# Patient Record
Sex: FEMALE | Race: WHITE | NOT HISPANIC OR LATINO | URBAN - METROPOLITAN AREA
[De-identification: names, ages, dates, MRNs, and addresses within clinical notes are randomized per-mention and may not be internally consistent; named-entity substitution may affect disease eponyms.]

---

## 2020-01-01 ENCOUNTER — INPATIENT (INPATIENT)
Facility: HOSPITAL | Age: 85
LOS: 1 days | DRG: 951 | End: 2020-06-14
Attending: FAMILY MEDICINE | Admitting: FAMILY MEDICINE
Payer: OTHER MISCELLANEOUS

## 2020-01-01 ENCOUNTER — INPATIENT (INPATIENT)
Facility: HOSPITAL | Age: 85
LOS: 8 days | Discharge: HOME | DRG: 871 | End: 2020-06-12
Attending: STUDENT IN AN ORGANIZED HEALTH CARE EDUCATION/TRAINING PROGRAM | Admitting: STUDENT IN AN ORGANIZED HEALTH CARE EDUCATION/TRAINING PROGRAM
Payer: MEDICARE

## 2020-01-01 VITALS
OXYGEN SATURATION: 98 % | SYSTOLIC BLOOD PRESSURE: 190 MMHG | HEIGHT: 62 IN | HEART RATE: 82 BPM | WEIGHT: 89.95 LBS | DIASTOLIC BLOOD PRESSURE: 72 MMHG | RESPIRATION RATE: 22 BRPM

## 2020-01-01 VITALS
OXYGEN SATURATION: 95 % | SYSTOLIC BLOOD PRESSURE: 109 MMHG | DIASTOLIC BLOOD PRESSURE: 55 MMHG | TEMPERATURE: 97 F | RESPIRATION RATE: 18 BRPM | HEART RATE: 109 BPM

## 2020-01-01 VITALS
OXYGEN SATURATION: 88 % | RESPIRATION RATE: 16 BRPM | DIASTOLIC BLOOD PRESSURE: 58 MMHG | HEART RATE: 113 BPM | SYSTOLIC BLOOD PRESSURE: 106 MMHG | TEMPERATURE: 97 F

## 2020-01-01 VITALS
SYSTOLIC BLOOD PRESSURE: 162 MMHG | OXYGEN SATURATION: 100 % | RESPIRATION RATE: 22 BRPM | DIASTOLIC BLOOD PRESSURE: 88 MMHG | HEART RATE: 127 BPM | TEMPERATURE: 99 F

## 2020-01-01 DIAGNOSIS — I16.0 HYPERTENSIVE URGENCY: ICD-10-CM

## 2020-01-01 DIAGNOSIS — Z51.5 ENCOUNTER FOR PALLIATIVE CARE: ICD-10-CM

## 2020-01-01 DIAGNOSIS — K11.7 DISTURBANCES OF SALIVARY SECRETION: ICD-10-CM

## 2020-01-01 DIAGNOSIS — F03.90 UNSPECIFIED DEMENTIA WITHOUT BEHAVIORAL DISTURBANCE: ICD-10-CM

## 2020-01-01 DIAGNOSIS — R06.03 ACUTE RESPIRATORY DISTRESS: ICD-10-CM

## 2020-01-01 DIAGNOSIS — I48.91 UNSPECIFIED ATRIAL FIBRILLATION: ICD-10-CM

## 2020-01-01 DIAGNOSIS — J18.9 PNEUMONIA, UNSPECIFIED ORGANISM: ICD-10-CM

## 2020-01-01 DIAGNOSIS — R53.81 OTHER MALAISE: ICD-10-CM
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
## 2020-01-01 DIAGNOSIS — J96.01 ACUTE RESPIRATORY FAILURE WITH HYPOXIA: ICD-10-CM

## 2020-01-01 DIAGNOSIS — G30.1 ALZHEIMER'S DISEASE WITH LATE ONSET: ICD-10-CM

## 2020-01-01 DIAGNOSIS — E43 UNSPECIFIED SEVERE PROTEIN-CALORIE MALNUTRITION: ICD-10-CM

## 2020-01-01 DIAGNOSIS — N17.9 ACUTE KIDNEY FAILURE, UNSPECIFIED: ICD-10-CM

## 2020-01-01 DIAGNOSIS — Z29.9 ENCOUNTER FOR PROPHYLACTIC MEASURES, UNSPECIFIED: ICD-10-CM

## 2020-01-01 DIAGNOSIS — R45.1 RESTLESSNESS AND AGITATION: ICD-10-CM

## 2020-01-01 DIAGNOSIS — Z71.89 OTHER SPECIFIED COUNSELING: ICD-10-CM

## 2020-01-01 LAB
A1C WITH ESTIMATED AVERAGE GLUCOSE RESULT: 5.2 % — SIGNIFICANT CHANGE UP (ref 4–5.6)
ALBUMIN SERPL ELPH-MCNC: 2.9 G/DL — LOW (ref 3.5–5)
ALBUMIN SERPL ELPH-MCNC: 3.1 G/DL — LOW (ref 3.5–5)
ALBUMIN SERPL ELPH-MCNC: 3.1 G/DL — LOW (ref 3.5–5)
ALBUMIN SERPL ELPH-MCNC: 3.4 G/DL — LOW (ref 3.5–5)
ALBUMIN SERPL ELPH-MCNC: 3.5 G/DL — SIGNIFICANT CHANGE UP (ref 3.5–5)
ALBUMIN SERPL ELPH-MCNC: 3.9 G/DL — SIGNIFICANT CHANGE UP (ref 3.5–5)
ALBUMIN SERPL ELPH-MCNC: 4.2 G/DL — SIGNIFICANT CHANGE UP (ref 3.5–5)
ALP SERPL-CCNC: 100 U/L — SIGNIFICANT CHANGE UP (ref 40–120)
ALP SERPL-CCNC: 106 U/L — SIGNIFICANT CHANGE UP (ref 40–120)
ALP SERPL-CCNC: 106 U/L — SIGNIFICANT CHANGE UP (ref 40–120)
ALP SERPL-CCNC: 127 U/L — HIGH (ref 40–120)
ALP SERPL-CCNC: 129 U/L — HIGH (ref 40–120)
ALP SERPL-CCNC: 90 U/L — SIGNIFICANT CHANGE UP (ref 40–120)
ALP SERPL-CCNC: 91 U/L — SIGNIFICANT CHANGE UP (ref 40–120)
ALT FLD-CCNC: 27 U/L DA — SIGNIFICANT CHANGE UP (ref 10–60)
ALT FLD-CCNC: 350 U/L DA — HIGH (ref 10–60)
ALT FLD-CCNC: 36 U/L DA — SIGNIFICANT CHANGE UP (ref 10–60)
ALT FLD-CCNC: 388 U/L DA — HIGH (ref 10–60)
ALT FLD-CCNC: 42 U/L DA — SIGNIFICANT CHANGE UP (ref 10–60)
ALT FLD-CCNC: 45 U/L DA — SIGNIFICANT CHANGE UP (ref 10–60)
ALT FLD-CCNC: 543 U/L DA — HIGH (ref 10–60)
ANION GAP SERPL CALC-SCNC: 10 MMOL/L — SIGNIFICANT CHANGE UP (ref 5–17)
ANION GAP SERPL CALC-SCNC: 10 MMOL/L — SIGNIFICANT CHANGE UP (ref 5–17)
ANION GAP SERPL CALC-SCNC: 11 MMOL/L — SIGNIFICANT CHANGE UP (ref 5–17)
ANION GAP SERPL CALC-SCNC: 11 MMOL/L — SIGNIFICANT CHANGE UP (ref 5–17)
ANION GAP SERPL CALC-SCNC: 12 MMOL/L — SIGNIFICANT CHANGE UP (ref 5–17)
ANION GAP SERPL CALC-SCNC: 13 MMOL/L — SIGNIFICANT CHANGE UP (ref 5–17)
ANION GAP SERPL CALC-SCNC: 19 MMOL/L — HIGH (ref 5–17)
ANION GAP SERPL CALC-SCNC: 7 MMOL/L — SIGNIFICANT CHANGE UP (ref 5–17)
ANION GAP SERPL CALC-SCNC: 9 MMOL/L — SIGNIFICANT CHANGE UP (ref 5–17)
ANISOCYTOSIS BLD QL: SLIGHT — SIGNIFICANT CHANGE UP
APPEARANCE UR: CLEAR — SIGNIFICANT CHANGE UP
APPEARANCE UR: CLEAR — SIGNIFICANT CHANGE UP
APTT BLD: 29.5 SEC — SIGNIFICANT CHANGE UP (ref 27.5–36.3)
AST SERPL-CCNC: 190 U/L — HIGH (ref 10–40)
AST SERPL-CCNC: 262 U/L — HIGH (ref 10–40)
AST SERPL-CCNC: 44 U/L — HIGH (ref 10–40)
AST SERPL-CCNC: 52 U/L — HIGH (ref 10–40)
AST SERPL-CCNC: 679 U/L — HIGH (ref 10–40)
AST SERPL-CCNC: 86 U/L — HIGH (ref 10–40)
AST SERPL-CCNC: 93 U/L — HIGH (ref 10–40)
BACTERIA # UR AUTO: ABNORMAL /HPF
BACTERIA # UR AUTO: NEGATIVE /HPF — SIGNIFICANT CHANGE UP
BASE EXCESS BLDA CALC-SCNC: -3.3 MMOL/L — LOW (ref -2–2)
BASOPHILS # BLD AUTO: 0 K/UL — SIGNIFICANT CHANGE UP (ref 0–0.2)
BASOPHILS # BLD AUTO: 0.01 K/UL — SIGNIFICANT CHANGE UP (ref 0–0.2)
BASOPHILS # BLD AUTO: 0.02 K/UL — SIGNIFICANT CHANGE UP (ref 0–0.2)
BASOPHILS # BLD AUTO: 0.06 K/UL — SIGNIFICANT CHANGE UP (ref 0–0.2)
BASOPHILS NFR BLD AUTO: 0 % — SIGNIFICANT CHANGE UP (ref 0–2)
BASOPHILS NFR BLD AUTO: 0.1 % — SIGNIFICANT CHANGE UP (ref 0–2)
BASOPHILS NFR BLD AUTO: 0.3 % — SIGNIFICANT CHANGE UP (ref 0–2)
BILIRUB SERPL-MCNC: 1.1 MG/DL — SIGNIFICANT CHANGE UP (ref 0.2–1.2)
BILIRUB SERPL-MCNC: 1.2 MG/DL — SIGNIFICANT CHANGE UP (ref 0.2–1.2)
BILIRUB SERPL-MCNC: 1.3 MG/DL — HIGH (ref 0.2–1.2)
BILIRUB UR-MCNC: NEGATIVE — SIGNIFICANT CHANGE UP
BILIRUB UR-MCNC: NEGATIVE — SIGNIFICANT CHANGE UP
BLOOD GAS COMMENTS ARTERIAL: SIGNIFICANT CHANGE UP
BLOOD GAS COMMENTS ARTERIAL: SIGNIFICANT CHANGE UP
BUN SERPL-MCNC: 46 MG/DL — HIGH (ref 7–18)
BUN SERPL-MCNC: 47 MG/DL — HIGH (ref 7–18)
BUN SERPL-MCNC: 57 MG/DL — HIGH (ref 7–18)
BUN SERPL-MCNC: 65 MG/DL — HIGH (ref 7–18)
BUN SERPL-MCNC: 82 MG/DL — HIGH (ref 7–18)
BUN SERPL-MCNC: 92 MG/DL — HIGH (ref 7–18)
BUN SERPL-MCNC: 93 MG/DL — HIGH (ref 7–18)
BUN SERPL-MCNC: 95 MG/DL — HIGH (ref 7–18)
BUN SERPL-MCNC: 95 MG/DL — HIGH (ref 7–18)
CALCIUM SERPL-MCNC: 8.5 MG/DL — SIGNIFICANT CHANGE UP (ref 8.4–10.5)
CALCIUM SERPL-MCNC: 8.6 MG/DL — SIGNIFICANT CHANGE UP (ref 8.4–10.5)
CALCIUM SERPL-MCNC: 8.7 MG/DL — SIGNIFICANT CHANGE UP (ref 8.4–10.5)
CALCIUM SERPL-MCNC: 8.8 MG/DL — SIGNIFICANT CHANGE UP (ref 8.4–10.5)
CALCIUM SERPL-MCNC: 9.1 MG/DL — SIGNIFICANT CHANGE UP (ref 8.4–10.5)
CALCIUM SERPL-MCNC: 9.4 MG/DL — SIGNIFICANT CHANGE UP (ref 8.4–10.5)
CALCIUM SERPL-MCNC: 9.8 MG/DL — SIGNIFICANT CHANGE UP (ref 8.4–10.5)
CALCIUM UR-MCNC: <1 MG/DL — SIGNIFICANT CHANGE UP
CHLORIDE SERPL-SCNC: 103 MMOL/L — SIGNIFICANT CHANGE UP (ref 96–108)
CHLORIDE SERPL-SCNC: 107 MMOL/L — SIGNIFICANT CHANGE UP (ref 96–108)
CHLORIDE SERPL-SCNC: 109 MMOL/L — HIGH (ref 96–108)
CHLORIDE SERPL-SCNC: 111 MMOL/L — HIGH (ref 96–108)
CHLORIDE SERPL-SCNC: 113 MMOL/L — HIGH (ref 96–108)
CHLORIDE SERPL-SCNC: 117 MMOL/L — HIGH (ref 96–108)
CHLORIDE SERPL-SCNC: 118 MMOL/L — HIGH (ref 96–108)
CHLORIDE UR-SCNC: <10 MMOL/L — SIGNIFICANT CHANGE UP
CHOLEST SERPL-MCNC: 201 MG/DL — HIGH (ref 10–199)
CK MB BLD-MCNC: 1.7 % — SIGNIFICANT CHANGE UP (ref 0–3.5)
CK MB BLD-MCNC: 1.8 % — SIGNIFICANT CHANGE UP (ref 0–3.5)
CK MB CFR SERPL CALC: 17.9 NG/ML — HIGH (ref 0–3.6)
CK MB CFR SERPL CALC: 21.4 NG/ML — HIGH (ref 0–3.6)
CK SERPL-CCNC: 1050 U/L — HIGH (ref 21–215)
CK SERPL-CCNC: 1180 U/L — HIGH (ref 21–215)
CK SERPL-CCNC: 835 U/L — HIGH (ref 21–215)
CO2 SERPL-SCNC: 20 MMOL/L — LOW (ref 22–31)
CO2 SERPL-SCNC: 21 MMOL/L — LOW (ref 22–31)
CO2 SERPL-SCNC: 23 MMOL/L — SIGNIFICANT CHANGE UP (ref 22–31)
CO2 SERPL-SCNC: 25 MMOL/L — SIGNIFICANT CHANGE UP (ref 22–31)
CO2 SERPL-SCNC: 26 MMOL/L — SIGNIFICANT CHANGE UP (ref 22–31)
CO2 SERPL-SCNC: 27 MMOL/L — SIGNIFICANT CHANGE UP (ref 22–31)
CO2 SERPL-SCNC: 28 MMOL/L — SIGNIFICANT CHANGE UP (ref 22–31)
CO2 SERPL-SCNC: 28 MMOL/L — SIGNIFICANT CHANGE UP (ref 22–31)
CO2 SERPL-SCNC: 29 MMOL/L — SIGNIFICANT CHANGE UP (ref 22–31)
COLOR SPEC: SIGNIFICANT CHANGE UP
COLOR SPEC: YELLOW — SIGNIFICANT CHANGE UP
COMMENT - URINE: SIGNIFICANT CHANGE UP
CREAT ?TM UR-MCNC: 93 MG/DL — SIGNIFICANT CHANGE UP
CREAT ?TM UR-MCNC: <13 MG/DL — SIGNIFICANT CHANGE UP
CREAT SERPL-MCNC: 1.49 MG/DL — HIGH (ref 0.5–1.3)
CREAT SERPL-MCNC: 1.68 MG/DL — HIGH (ref 0.5–1.3)
CREAT SERPL-MCNC: 1.71 MG/DL — HIGH (ref 0.5–1.3)
CREAT SERPL-MCNC: 1.83 MG/DL — HIGH (ref 0.5–1.3)
CREAT SERPL-MCNC: 2.09 MG/DL — HIGH (ref 0.5–1.3)
CREAT SERPL-MCNC: 2.18 MG/DL — HIGH (ref 0.5–1.3)
CREAT SERPL-MCNC: 2.25 MG/DL — HIGH (ref 0.5–1.3)
CREAT SERPL-MCNC: 2.27 MG/DL — HIGH (ref 0.5–1.3)
CREAT SERPL-MCNC: 2.3 MG/DL — HIGH (ref 0.5–1.3)
CRP SERPL-MCNC: 3.09 MG/DL — HIGH (ref 0–0.4)
CULTURE RESULTS: NO GROWTH — SIGNIFICANT CHANGE UP
CULTURE RESULTS: SIGNIFICANT CHANGE UP
CULTURE RESULTS: SIGNIFICANT CHANGE UP
D DIMER BLD IA.RAPID-MCNC: 971 NG/ML DDU — HIGH
DIFF PNL FLD: ABNORMAL
DIFF PNL FLD: ABNORMAL
EOSINOPHIL # BLD AUTO: 0 K/UL — SIGNIFICANT CHANGE UP (ref 0–0.5)
EOSINOPHIL # BLD AUTO: 0.01 K/UL — SIGNIFICANT CHANGE UP (ref 0–0.5)
EOSINOPHIL # BLD AUTO: 0.02 K/UL — SIGNIFICANT CHANGE UP (ref 0–0.5)
EOSINOPHIL NFR BLD AUTO: 0 % — SIGNIFICANT CHANGE UP (ref 0–6)
EOSINOPHIL NFR BLD AUTO: 0.1 % — SIGNIFICANT CHANGE UP (ref 0–6)
EOSINOPHIL NFR BLD AUTO: 0.1 % — SIGNIFICANT CHANGE UP (ref 0–6)
EPI CELLS # UR: SIGNIFICANT CHANGE UP /HPF
EPI CELLS # UR: SIGNIFICANT CHANGE UP /HPF
ERYTHROCYTE [SEDIMENTATION RATE] IN BLOOD: 21 MM/HR — HIGH (ref 0–20)
ESTIMATED AVERAGE GLUCOSE: 103 MG/DL — SIGNIFICANT CHANGE UP (ref 68–114)
FOLATE SERPL-MCNC: 17 NG/ML — SIGNIFICANT CHANGE UP
GIANT PLATELETS BLD QL SMEAR: PRESENT — SIGNIFICANT CHANGE UP
GLUCOSE BLDC GLUCOMTR-MCNC: 102 MG/DL — HIGH (ref 70–99)
GLUCOSE BLDC GLUCOMTR-MCNC: 104 MG/DL — HIGH (ref 70–99)
GLUCOSE BLDC GLUCOMTR-MCNC: 104 MG/DL — HIGH (ref 70–99)
GLUCOSE BLDC GLUCOMTR-MCNC: 108 MG/DL — HIGH (ref 70–99)
GLUCOSE BLDC GLUCOMTR-MCNC: 109 MG/DL — HIGH (ref 70–99)
GLUCOSE BLDC GLUCOMTR-MCNC: 111 MG/DL — HIGH (ref 70–99)
GLUCOSE BLDC GLUCOMTR-MCNC: 113 MG/DL — HIGH (ref 70–99)
GLUCOSE BLDC GLUCOMTR-MCNC: 116 MG/DL — HIGH (ref 70–99)
GLUCOSE BLDC GLUCOMTR-MCNC: 117 MG/DL — HIGH (ref 70–99)
GLUCOSE BLDC GLUCOMTR-MCNC: 121 MG/DL — HIGH (ref 70–99)
GLUCOSE BLDC GLUCOMTR-MCNC: 127 MG/DL — HIGH (ref 70–99)
GLUCOSE BLDC GLUCOMTR-MCNC: 128 MG/DL — HIGH (ref 70–99)
GLUCOSE BLDC GLUCOMTR-MCNC: 133 MG/DL — HIGH (ref 70–99)
GLUCOSE BLDC GLUCOMTR-MCNC: 135 MG/DL — HIGH (ref 70–99)
GLUCOSE BLDC GLUCOMTR-MCNC: 136 MG/DL — HIGH (ref 70–99)
GLUCOSE BLDC GLUCOMTR-MCNC: 136 MG/DL — HIGH (ref 70–99)
GLUCOSE BLDC GLUCOMTR-MCNC: 139 MG/DL — HIGH (ref 70–99)
GLUCOSE BLDC GLUCOMTR-MCNC: 143 MG/DL — HIGH (ref 70–99)
GLUCOSE BLDC GLUCOMTR-MCNC: 146 MG/DL — HIGH (ref 70–99)
GLUCOSE BLDC GLUCOMTR-MCNC: 148 MG/DL — HIGH (ref 70–99)
GLUCOSE BLDC GLUCOMTR-MCNC: 158 MG/DL — HIGH (ref 70–99)
GLUCOSE BLDC GLUCOMTR-MCNC: 160 MG/DL — HIGH (ref 70–99)
GLUCOSE BLDC GLUCOMTR-MCNC: 166 MG/DL — HIGH (ref 70–99)
GLUCOSE BLDC GLUCOMTR-MCNC: 93 MG/DL — SIGNIFICANT CHANGE UP (ref 70–99)
GLUCOSE BLDC GLUCOMTR-MCNC: 96 MG/DL — SIGNIFICANT CHANGE UP (ref 70–99)
GLUCOSE BLDC GLUCOMTR-MCNC: 98 MG/DL — SIGNIFICANT CHANGE UP (ref 70–99)
GLUCOSE SERPL-MCNC: 115 MG/DL — HIGH (ref 70–99)
GLUCOSE SERPL-MCNC: 127 MG/DL — HIGH (ref 70–99)
GLUCOSE SERPL-MCNC: 128 MG/DL — HIGH (ref 70–99)
GLUCOSE SERPL-MCNC: 140 MG/DL — HIGH (ref 70–99)
GLUCOSE SERPL-MCNC: 141 MG/DL — HIGH (ref 70–99)
GLUCOSE SERPL-MCNC: 151 MG/DL — HIGH (ref 70–99)
GLUCOSE SERPL-MCNC: 160 MG/DL — HIGH (ref 70–99)
GLUCOSE SERPL-MCNC: 77 MG/DL — SIGNIFICANT CHANGE UP (ref 70–99)
GLUCOSE SERPL-MCNC: 89 MG/DL — SIGNIFICANT CHANGE UP (ref 70–99)
GLUCOSE UR QL: NEGATIVE — SIGNIFICANT CHANGE UP
GLUCOSE UR QL: NEGATIVE — SIGNIFICANT CHANGE UP
HCO3 BLDA-SCNC: 21 MMOL/L — LOW (ref 23–27)
HCT VFR BLD CALC: 34.2 % — LOW (ref 34.5–45)
HCT VFR BLD CALC: 36.4 % — SIGNIFICANT CHANGE UP (ref 34.5–45)
HCT VFR BLD CALC: 36.6 % — SIGNIFICANT CHANGE UP (ref 34.5–45)
HCT VFR BLD CALC: 37.1 % — SIGNIFICANT CHANGE UP (ref 34.5–45)
HCT VFR BLD CALC: 37.2 % — SIGNIFICANT CHANGE UP (ref 34.5–45)
HCT VFR BLD CALC: 37.8 % — SIGNIFICANT CHANGE UP (ref 34.5–45)
HCT VFR BLD CALC: 38 % — SIGNIFICANT CHANGE UP (ref 34.5–45)
HDLC SERPL-MCNC: 93 MG/DL — SIGNIFICANT CHANGE UP
HGB BLD-MCNC: 10.9 G/DL — LOW (ref 11.5–15.5)
HGB BLD-MCNC: 11.3 G/DL — LOW (ref 11.5–15.5)
HGB BLD-MCNC: 11.3 G/DL — LOW (ref 11.5–15.5)
HGB BLD-MCNC: 11.5 G/DL — SIGNIFICANT CHANGE UP (ref 11.5–15.5)
HGB BLD-MCNC: 11.7 G/DL — SIGNIFICANT CHANGE UP (ref 11.5–15.5)
HOROWITZ INDEX BLDA+IHG-RTO: 100 — SIGNIFICANT CHANGE UP
IMM GRANULOCYTES NFR BLD AUTO: 0.5 % — SIGNIFICANT CHANGE UP (ref 0–1.5)
IMM GRANULOCYTES NFR BLD AUTO: 0.6 % — SIGNIFICANT CHANGE UP (ref 0–1.5)
IMM GRANULOCYTES NFR BLD AUTO: 1.2 % — SIGNIFICANT CHANGE UP (ref 0–1.5)
IMM GRANULOCYTES NFR BLD AUTO: 1.7 % — HIGH (ref 0–1.5)
INR BLD: 0.97 RATIO — SIGNIFICANT CHANGE UP (ref 0.88–1.16)
KETONES UR-MCNC: NEGATIVE — SIGNIFICANT CHANGE UP
KETONES UR-MCNC: NEGATIVE — SIGNIFICANT CHANGE UP
LACTATE SERPL-SCNC: 1.9 MMOL/L — SIGNIFICANT CHANGE UP (ref 0.7–2)
LACTATE SERPL-SCNC: 2.3 MMOL/L — HIGH (ref 0.7–2)
LACTATE SERPL-SCNC: 2.6 MMOL/L — HIGH (ref 0.7–2)
LACTATE SERPL-SCNC: 3.2 MMOL/L — HIGH (ref 0.7–2)
LACTATE SERPL-SCNC: 3.8 MMOL/L — HIGH (ref 0.7–2)
LDH SERPL L TO P-CCNC: 351 U/L — HIGH (ref 120–225)
LEUKOCYTE ESTERASE UR-ACNC: NEGATIVE — SIGNIFICANT CHANGE UP
LEUKOCYTE ESTERASE UR-ACNC: NEGATIVE — SIGNIFICANT CHANGE UP
LG PLATELETS BLD QL AUTO: SLIGHT — SIGNIFICANT CHANGE UP
LIPID PNL WITH DIRECT LDL SERPL: 97 MG/DL — SIGNIFICANT CHANGE UP
LYMPHOCYTES # BLD AUTO: 0.59 K/UL — LOW (ref 1–3.3)
LYMPHOCYTES # BLD AUTO: 0.65 K/UL — LOW (ref 1–3.3)
LYMPHOCYTES # BLD AUTO: 0.79 K/UL — LOW (ref 1–3.3)
LYMPHOCYTES # BLD AUTO: 0.91 K/UL — LOW (ref 1–3.3)
LYMPHOCYTES # BLD AUTO: 0.98 K/UL — LOW (ref 1–3.3)
LYMPHOCYTES # BLD AUTO: 1.01 K/UL — SIGNIFICANT CHANGE UP (ref 1–3.3)
LYMPHOCYTES # BLD AUTO: 1.17 K/UL — SIGNIFICANT CHANGE UP (ref 1–3.3)
LYMPHOCYTES # BLD AUTO: 3.1 % — LOW (ref 13–44)
LYMPHOCYTES # BLD AUTO: 3.4 % — LOW (ref 13–44)
LYMPHOCYTES # BLD AUTO: 3.6 % — LOW (ref 13–44)
LYMPHOCYTES # BLD AUTO: 4 % — LOW (ref 13–44)
LYMPHOCYTES # BLD AUTO: 6.3 % — LOW (ref 13–44)
LYMPHOCYTES # BLD AUTO: 7.8 % — LOW (ref 13–44)
LYMPHOCYTES # BLD AUTO: 8.8 % — LOW (ref 13–44)
MACROCYTES BLD QL: SLIGHT — SIGNIFICANT CHANGE UP
MAGNESIUM SERPL-MCNC: 2.1 MG/DL — SIGNIFICANT CHANGE UP (ref 1.6–2.6)
MAGNESIUM SERPL-MCNC: 2.6 MG/DL — SIGNIFICANT CHANGE UP (ref 1.6–2.6)
MAGNESIUM SERPL-MCNC: 2.8 MG/DL — HIGH (ref 1.6–2.6)
MAGNESIUM SERPL-MCNC: 2.9 MG/DL — HIGH (ref 1.6–2.6)
MAGNESIUM SERPL-MCNC: 3 MG/DL — HIGH (ref 1.6–2.6)
MAGNESIUM SERPL-MCNC: 3.1 MG/DL — HIGH (ref 1.6–2.6)
MANUAL SMEAR VERIFICATION: SIGNIFICANT CHANGE UP
MANUAL SMEAR VERIFICATION: SIGNIFICANT CHANGE UP
MCHC RBC-ENTMCNC: 28 PG — SIGNIFICANT CHANGE UP (ref 27–34)
MCHC RBC-ENTMCNC: 28 PG — SIGNIFICANT CHANGE UP (ref 27–34)
MCHC RBC-ENTMCNC: 28.3 PG — SIGNIFICANT CHANGE UP (ref 27–34)
MCHC RBC-ENTMCNC: 28.3 PG — SIGNIFICANT CHANGE UP (ref 27–34)
MCHC RBC-ENTMCNC: 28.5 PG — SIGNIFICANT CHANGE UP (ref 27–34)
MCHC RBC-ENTMCNC: 28.7 PG — SIGNIFICANT CHANGE UP (ref 27–34)
MCHC RBC-ENTMCNC: 28.8 PG — SIGNIFICANT CHANGE UP (ref 27–34)
MCHC RBC-ENTMCNC: 30.3 GM/DL — LOW (ref 32–36)
MCHC RBC-ENTMCNC: 30.5 GM/DL — LOW (ref 32–36)
MCHC RBC-ENTMCNC: 30.9 GM/DL — LOW (ref 32–36)
MCHC RBC-ENTMCNC: 31 GM/DL — LOW (ref 32–36)
MCHC RBC-ENTMCNC: 31 GM/DL — LOW (ref 32–36)
MCHC RBC-ENTMCNC: 31.4 GM/DL — LOW (ref 32–36)
MCHC RBC-ENTMCNC: 31.9 GM/DL — LOW (ref 32–36)
MCV RBC AUTO: 90.5 FL — SIGNIFICANT CHANGE UP (ref 80–100)
MCV RBC AUTO: 91.3 FL — SIGNIFICANT CHANGE UP (ref 80–100)
MCV RBC AUTO: 91.3 FL — SIGNIFICANT CHANGE UP (ref 80–100)
MCV RBC AUTO: 91.4 FL — SIGNIFICANT CHANGE UP (ref 80–100)
MCV RBC AUTO: 91.7 FL — SIGNIFICANT CHANGE UP (ref 80–100)
MCV RBC AUTO: 91.8 FL — SIGNIFICANT CHANGE UP (ref 80–100)
MCV RBC AUTO: 92.7 FL — SIGNIFICANT CHANGE UP (ref 80–100)
MONOCYTES # BLD AUTO: 0.94 K/UL — HIGH (ref 0–0.9)
MONOCYTES # BLD AUTO: 1.21 K/UL — HIGH (ref 0–0.9)
MONOCYTES # BLD AUTO: 1.41 K/UL — HIGH (ref 0–0.9)
MONOCYTES # BLD AUTO: 1.53 K/UL — HIGH (ref 0–0.9)
MONOCYTES # BLD AUTO: 1.59 K/UL — HIGH (ref 0–0.9)
MONOCYTES # BLD AUTO: 1.86 K/UL — HIGH (ref 0–0.9)
MONOCYTES # BLD AUTO: 1.91 K/UL — HIGH (ref 0–0.9)
MONOCYTES NFR BLD AUTO: 7 % — SIGNIFICANT CHANGE UP (ref 2–14)
MONOCYTES NFR BLD AUTO: 8.1 % — SIGNIFICANT CHANGE UP (ref 2–14)
MONOCYTES NFR BLD AUTO: 8.2 % — SIGNIFICANT CHANGE UP (ref 2–14)
MONOCYTES NFR BLD AUTO: 8.6 % — SIGNIFICANT CHANGE UP (ref 2–14)
MONOCYTES NFR BLD AUTO: 8.7 % — SIGNIFICANT CHANGE UP (ref 2–14)
MONOCYTES NFR BLD AUTO: 8.9 % — SIGNIFICANT CHANGE UP (ref 2–14)
MONOCYTES NFR BLD AUTO: 9 % — SIGNIFICANT CHANGE UP (ref 2–14)
NEUTROPHILS # BLD AUTO: 12.26 K/UL — HIGH (ref 1.8–7.4)
NEUTROPHILS # BLD AUTO: 13.14 K/UL — HIGH (ref 1.8–7.4)
NEUTROPHILS # BLD AUTO: 15.33 K/UL — HIGH (ref 1.8–7.4)
NEUTROPHILS # BLD AUTO: 18.36 K/UL — HIGH (ref 1.8–7.4)
NEUTROPHILS # BLD AUTO: 19.24 K/UL — HIGH (ref 1.8–7.4)
NEUTROPHILS # BLD AUTO: 20.23 K/UL — HIGH (ref 1.8–7.4)
NEUTROPHILS # BLD AUTO: 9.42 K/UL — HIGH (ref 1.8–7.4)
NEUTROPHILS NFR BLD AUTO: 81.6 % — HIGH (ref 43–77)
NEUTROPHILS NFR BLD AUTO: 82.2 % — HIGH (ref 43–77)
NEUTROPHILS NFR BLD AUTO: 84.1 % — HIGH (ref 43–77)
NEUTROPHILS NFR BLD AUTO: 87.1 % — HIGH (ref 43–77)
NEUTROPHILS NFR BLD AUTO: 87.1 % — HIGH (ref 43–77)
NEUTROPHILS NFR BLD AUTO: 87.4 % — HIGH (ref 43–77)
NEUTROPHILS NFR BLD AUTO: 89 % — HIGH (ref 43–77)
NITRITE UR-MCNC: NEGATIVE — SIGNIFICANT CHANGE UP
NITRITE UR-MCNC: NEGATIVE — SIGNIFICANT CHANGE UP
NRBC # BLD: 0 /100 WBCS — SIGNIFICANT CHANGE UP (ref 0–0)
NRBC # BLD: 0 /100 — SIGNIFICANT CHANGE UP (ref 0–0)
NT-PROBNP SERPL-SCNC: 5863 PG/ML — HIGH (ref 0–450)
OSMOLALITY UR: 534 MOS/KG — SIGNIFICANT CHANGE UP (ref 50–1200)
OVALOCYTES BLD QL SMEAR: SLIGHT — SIGNIFICANT CHANGE UP
PCO2 BLDA: 37 MMHG — SIGNIFICANT CHANGE UP (ref 32–46)
PH BLDA: 7.37 — SIGNIFICANT CHANGE UP (ref 7.35–7.45)
PH UR: 5 — SIGNIFICANT CHANGE UP (ref 5–8)
PH UR: 5 — SIGNIFICANT CHANGE UP (ref 5–8)
PHOSPHATE SERPL-MCNC: 2.6 MG/DL — SIGNIFICANT CHANGE UP (ref 2.5–4.5)
PHOSPHATE SERPL-MCNC: 2.8 MG/DL — SIGNIFICANT CHANGE UP (ref 2.5–4.5)
PHOSPHATE SERPL-MCNC: 3.3 MG/DL — SIGNIFICANT CHANGE UP (ref 2.5–4.5)
PHOSPHATE SERPL-MCNC: 3.4 MG/DL — SIGNIFICANT CHANGE UP (ref 2.5–4.5)
PHOSPHATE SERPL-MCNC: 3.8 MG/DL — SIGNIFICANT CHANGE UP (ref 2.5–4.5)
PHOSPHATE SERPL-MCNC: 4.3 MG/DL — SIGNIFICANT CHANGE UP (ref 2.5–4.5)
PLAT MORPH BLD: NORMAL — SIGNIFICANT CHANGE UP
PLAT MORPH BLD: NORMAL — SIGNIFICANT CHANGE UP
PLATELET # BLD AUTO: 222 K/UL — SIGNIFICANT CHANGE UP (ref 150–400)
PLATELET # BLD AUTO: 228 K/UL — SIGNIFICANT CHANGE UP (ref 150–400)
PLATELET # BLD AUTO: 229 K/UL — SIGNIFICANT CHANGE UP (ref 150–400)
PLATELET # BLD AUTO: 229 K/UL — SIGNIFICANT CHANGE UP (ref 150–400)
PLATELET # BLD AUTO: 233 K/UL — SIGNIFICANT CHANGE UP (ref 150–400)
PLATELET # BLD AUTO: 252 K/UL — SIGNIFICANT CHANGE UP (ref 150–400)
PLATELET # BLD AUTO: 287 K/UL — SIGNIFICANT CHANGE UP (ref 150–400)
PO2 BLDA: 91 MMHG — SIGNIFICANT CHANGE UP (ref 74–108)
POIKILOCYTOSIS BLD QL AUTO: SLIGHT — SIGNIFICANT CHANGE UP
POTASSIUM SERPL-MCNC: 2.7 MMOL/L — CRITICAL LOW (ref 3.5–5.3)
POTASSIUM SERPL-MCNC: 2.9 MMOL/L — CRITICAL LOW (ref 3.5–5.3)
POTASSIUM SERPL-MCNC: 3 MMOL/L — LOW (ref 3.5–5.3)
POTASSIUM SERPL-MCNC: 3.2 MMOL/L — LOW (ref 3.5–5.3)
POTASSIUM SERPL-MCNC: 3.2 MMOL/L — LOW (ref 3.5–5.3)
POTASSIUM SERPL-MCNC: 3.3 MMOL/L — LOW (ref 3.5–5.3)
POTASSIUM SERPL-MCNC: 3.3 MMOL/L — LOW (ref 3.5–5.3)
POTASSIUM SERPL-MCNC: 3.4 MMOL/L — LOW (ref 3.5–5.3)
POTASSIUM SERPL-MCNC: 3.5 MMOL/L — SIGNIFICANT CHANGE UP (ref 3.5–5.3)
POTASSIUM SERPL-SCNC: 2.7 MMOL/L — CRITICAL LOW (ref 3.5–5.3)
POTASSIUM SERPL-SCNC: 2.9 MMOL/L — CRITICAL LOW (ref 3.5–5.3)
POTASSIUM SERPL-SCNC: 3 MMOL/L — LOW (ref 3.5–5.3)
POTASSIUM SERPL-SCNC: 3.2 MMOL/L — LOW (ref 3.5–5.3)
POTASSIUM SERPL-SCNC: 3.2 MMOL/L — LOW (ref 3.5–5.3)
POTASSIUM SERPL-SCNC: 3.3 MMOL/L — LOW (ref 3.5–5.3)
POTASSIUM SERPL-SCNC: 3.3 MMOL/L — LOW (ref 3.5–5.3)
POTASSIUM SERPL-SCNC: 3.4 MMOL/L — LOW (ref 3.5–5.3)
POTASSIUM SERPL-SCNC: 3.5 MMOL/L — SIGNIFICANT CHANGE UP (ref 3.5–5.3)
PROT SERPL-MCNC: 6.4 G/DL — SIGNIFICANT CHANGE UP (ref 6–8.3)
PROT SERPL-MCNC: 6.5 G/DL — SIGNIFICANT CHANGE UP (ref 6–8.3)
PROT SERPL-MCNC: 6.7 G/DL — SIGNIFICANT CHANGE UP (ref 6–8.3)
PROT SERPL-MCNC: 7.6 G/DL — SIGNIFICANT CHANGE UP (ref 6–8.3)
PROT SERPL-MCNC: 7.6 G/DL — SIGNIFICANT CHANGE UP (ref 6–8.3)
PROT SERPL-MCNC: 8 G/DL — SIGNIFICANT CHANGE UP (ref 6–8.3)
PROT SERPL-MCNC: 8.6 G/DL — HIGH (ref 6–8.3)
PROT UR-MCNC: 100
PROT UR-MCNC: 15
PROTHROM AB SERPL-ACNC: 10.9 SEC — SIGNIFICANT CHANGE UP (ref 10–12.9)
RBC # BLD: 3.78 M/UL — LOW (ref 3.8–5.2)
RBC # BLD: 3.97 M/UL — SIGNIFICANT CHANGE UP (ref 3.8–5.2)
RBC # BLD: 4.01 M/UL — SIGNIFICANT CHANGE UP (ref 3.8–5.2)
RBC # BLD: 4.04 M/UL — SIGNIFICANT CHANGE UP (ref 3.8–5.2)
RBC # BLD: 4.07 M/UL — SIGNIFICANT CHANGE UP (ref 3.8–5.2)
RBC # BLD: 4.1 M/UL — SIGNIFICANT CHANGE UP (ref 3.8–5.2)
RBC # BLD: 4.14 M/UL — SIGNIFICANT CHANGE UP (ref 3.8–5.2)
RBC # FLD: 14.9 % — HIGH (ref 10.3–14.5)
RBC # FLD: 15 % — HIGH (ref 10.3–14.5)
RBC # FLD: 15 % — HIGH (ref 10.3–14.5)
RBC # FLD: 15.1 % — HIGH (ref 10.3–14.5)
RBC # FLD: 15.3 % — HIGH (ref 10.3–14.5)
RBC # FLD: 15.3 % — HIGH (ref 10.3–14.5)
RBC # FLD: 15.8 % — HIGH (ref 10.3–14.5)
RBC BLD AUTO: ABNORMAL
RBC BLD AUTO: NORMAL — SIGNIFICANT CHANGE UP
RBC CASTS # UR COMP ASSIST: ABNORMAL /HPF (ref 0–2)
RBC CASTS # UR COMP ASSIST: NEGATIVE /HPF — SIGNIFICANT CHANGE UP (ref 0–2)
SAO2 % BLDA: 96 % — SIGNIFICANT CHANGE UP (ref 92–96)
SARS-COV-2 RNA SPEC QL NAA+PROBE: SIGNIFICANT CHANGE UP
SARS-COV-2 RNA SPEC QL NAA+PROBE: SIGNIFICANT CHANGE UP
SODIUM SERPL-SCNC: 141 MMOL/L — SIGNIFICANT CHANGE UP (ref 135–145)
SODIUM SERPL-SCNC: 141 MMOL/L — SIGNIFICANT CHANGE UP (ref 135–145)
SODIUM SERPL-SCNC: 146 MMOL/L — HIGH (ref 135–145)
SODIUM SERPL-SCNC: 149 MMOL/L — HIGH (ref 135–145)
SODIUM SERPL-SCNC: 151 MMOL/L — HIGH (ref 135–145)
SODIUM SERPL-SCNC: 153 MMOL/L — HIGH (ref 135–145)
SODIUM SERPL-SCNC: 154 MMOL/L — HIGH (ref 135–145)
SODIUM UR-SCNC: 12 MMOL/L — SIGNIFICANT CHANGE UP
SODIUM UR-SCNC: 135 MMOL/L — SIGNIFICANT CHANGE UP
SP GR SPEC: 1.01 — SIGNIFICANT CHANGE UP (ref 1.01–1.02)
SP GR SPEC: 1.01 — SIGNIFICANT CHANGE UP (ref 1.01–1.02)
SPECIMEN SOURCE: SIGNIFICANT CHANGE UP
TOTAL CHOLESTEROL/HDL RATIO MEASUREMENT: 2.2 RATIO — LOW (ref 3.3–7.1)
TRIGL SERPL-MCNC: 55 MG/DL — SIGNIFICANT CHANGE UP (ref 10–149)
TROPONIN I SERPL-MCNC: 0.15 NG/ML — HIGH (ref 0–0.04)
TROPONIN I SERPL-MCNC: 0.25 NG/ML — HIGH (ref 0–0.04)
TROPONIN I SERPL-MCNC: 0.4 NG/ML — HIGH (ref 0–0.04)
TSH SERPL-MCNC: 1.77 UU/ML — SIGNIFICANT CHANGE UP (ref 0.34–4.82)
UROBILINOGEN FLD QL: NEGATIVE — SIGNIFICANT CHANGE UP
UROBILINOGEN FLD QL: NEGATIVE — SIGNIFICANT CHANGE UP
VANCOMYCIN TROUGH SERPL-MCNC: 9.6 UG/ML — LOW (ref 10–20)
VIT B12 SERPL-MCNC: 1270 PG/ML — HIGH (ref 232–1245)
WBC # BLD: 11.53 K/UL — HIGH (ref 3.8–10.5)
WBC # BLD: 14.94 K/UL — HIGH (ref 3.8–10.5)
WBC # BLD: 15.63 K/UL — HIGH (ref 3.8–10.5)
WBC # BLD: 17.59 K/UL — HIGH (ref 3.8–10.5)
WBC # BLD: 21 K/UL — HIGH (ref 3.8–10.5)
WBC # BLD: 22.09 K/UL — HIGH (ref 3.8–10.5)
WBC # BLD: 22.73 K/UL — HIGH (ref 3.8–10.5)
WBC # FLD AUTO: 11.53 K/UL — HIGH (ref 3.8–10.5)
WBC # FLD AUTO: 14.94 K/UL — HIGH (ref 3.8–10.5)
WBC # FLD AUTO: 15.63 K/UL — HIGH (ref 3.8–10.5)
WBC # FLD AUTO: 17.59 K/UL — HIGH (ref 3.8–10.5)
WBC # FLD AUTO: 21 K/UL — HIGH (ref 3.8–10.5)
WBC # FLD AUTO: 22.09 K/UL — HIGH (ref 3.8–10.5)
WBC # FLD AUTO: 22.73 K/UL — HIGH (ref 3.8–10.5)
WBC UR QL: SIGNIFICANT CHANGE UP /HPF (ref 0–5)
WBC UR QL: SIGNIFICANT CHANGE UP /HPF (ref 0–5)

## 2020-01-01 PROCEDURE — 82340 ASSAY OF CALCIUM IN URINE: CPT

## 2020-01-01 PROCEDURE — 83880 ASSAY OF NATRIURETIC PEPTIDE: CPT

## 2020-01-01 PROCEDURE — 93005 ELECTROCARDIOGRAM TRACING: CPT

## 2020-01-01 PROCEDURE — 70450 CT HEAD/BRAIN W/O DYE: CPT

## 2020-01-01 PROCEDURE — 99223 1ST HOSP IP/OBS HIGH 75: CPT

## 2020-01-01 PROCEDURE — 93306 TTE W/DOPPLER COMPLETE: CPT

## 2020-01-01 PROCEDURE — 99233 SBSQ HOSP IP/OBS HIGH 50: CPT | Mod: GC

## 2020-01-01 PROCEDURE — 84300 ASSAY OF URINE SODIUM: CPT

## 2020-01-01 PROCEDURE — 84484 ASSAY OF TROPONIN QUANT: CPT

## 2020-01-01 PROCEDURE — 71045 X-RAY EXAM CHEST 1 VIEW: CPT | Mod: 26

## 2020-01-01 PROCEDURE — 87040 BLOOD CULTURE FOR BACTERIA: CPT

## 2020-01-01 PROCEDURE — 87086 URINE CULTURE/COLONY COUNT: CPT

## 2020-01-01 PROCEDURE — 83036 HEMOGLOBIN GLYCOSYLATED A1C: CPT

## 2020-01-01 PROCEDURE — 99291 CRITICAL CARE FIRST HOUR: CPT

## 2020-01-01 PROCEDURE — 84100 ASSAY OF PHOSPHORUS: CPT

## 2020-01-01 PROCEDURE — 97162 PT EVAL MOD COMPLEX 30 MIN: CPT

## 2020-01-01 PROCEDURE — 82962 GLUCOSE BLOOD TEST: CPT

## 2020-01-01 PROCEDURE — 94640 AIRWAY INHALATION TREATMENT: CPT

## 2020-01-01 PROCEDURE — 85652 RBC SED RATE AUTOMATED: CPT

## 2020-01-01 PROCEDURE — 83935 ASSAY OF URINE OSMOLALITY: CPT

## 2020-01-01 PROCEDURE — 99233 SBSQ HOSP IP/OBS HIGH 50: CPT

## 2020-01-01 PROCEDURE — 82746 ASSAY OF FOLIC ACID SERUM: CPT

## 2020-01-01 PROCEDURE — 99232 SBSQ HOSP IP/OBS MODERATE 35: CPT | Mod: GC

## 2020-01-01 PROCEDURE — 99239 HOSP IP/OBS DSCHRG MGMT >30: CPT | Mod: GC,GW

## 2020-01-01 PROCEDURE — 80061 LIPID PANEL: CPT

## 2020-01-01 PROCEDURE — 80202 ASSAY OF VANCOMYCIN: CPT

## 2020-01-01 PROCEDURE — 99291 CRITICAL CARE FIRST HOUR: CPT | Mod: 25

## 2020-01-01 PROCEDURE — 83735 ASSAY OF MAGNESIUM: CPT

## 2020-01-01 PROCEDURE — 81001 URINALYSIS AUTO W/SCOPE: CPT

## 2020-01-01 PROCEDURE — 96375 TX/PRO/DX INJ NEW DRUG ADDON: CPT

## 2020-01-01 PROCEDURE — 71045 X-RAY EXAM CHEST 1 VIEW: CPT

## 2020-01-01 PROCEDURE — 80053 COMPREHEN METABOLIC PANEL: CPT

## 2020-01-01 PROCEDURE — 83615 LACTATE (LD) (LDH) ENZYME: CPT

## 2020-01-01 PROCEDURE — U0003: CPT

## 2020-01-01 PROCEDURE — 82803 BLOOD GASES ANY COMBINATION: CPT

## 2020-01-01 PROCEDURE — 83605 ASSAY OF LACTIC ACID: CPT

## 2020-01-01 PROCEDURE — 80048 BASIC METABOLIC PNL TOTAL CA: CPT

## 2020-01-01 PROCEDURE — 82607 VITAMIN B-12: CPT

## 2020-01-01 PROCEDURE — 82436 ASSAY OF URINE CHLORIDE: CPT

## 2020-01-01 PROCEDURE — 85027 COMPLETE CBC AUTOMATED: CPT

## 2020-01-01 PROCEDURE — 86140 C-REACTIVE PROTEIN: CPT

## 2020-01-01 PROCEDURE — 85610 PROTHROMBIN TIME: CPT

## 2020-01-01 PROCEDURE — 96374 THER/PROPH/DIAG INJ IV PUSH: CPT

## 2020-01-01 PROCEDURE — 82570 ASSAY OF URINE CREATININE: CPT

## 2020-01-01 PROCEDURE — 99223 1ST HOSP IP/OBS HIGH 75: CPT | Mod: GC

## 2020-01-01 PROCEDURE — 82550 ASSAY OF CK (CPK): CPT

## 2020-01-01 PROCEDURE — 92610 EVALUATE SWALLOWING FUNCTION: CPT

## 2020-01-01 PROCEDURE — 70450 CT HEAD/BRAIN W/O DYE: CPT | Mod: 26

## 2020-01-01 PROCEDURE — 85379 FIBRIN DEGRADATION QUANT: CPT

## 2020-01-01 PROCEDURE — 84443 ASSAY THYROID STIM HORMONE: CPT

## 2020-01-01 PROCEDURE — 85730 THROMBOPLASTIN TIME PARTIAL: CPT

## 2020-01-01 PROCEDURE — 82553 CREATINE MB FRACTION: CPT

## 2020-01-01 PROCEDURE — 36415 COLL VENOUS BLD VENIPUNCTURE: CPT

## 2020-01-01 PROCEDURE — 99223 1ST HOSP IP/OBS HIGH 75: CPT | Mod: 25

## 2020-01-01 RX ORDER — METOPROLOL TARTRATE 50 MG
12.5 TABLET ORAL
Refills: 0 | Status: DISCONTINUED | OUTPATIENT
Start: 2020-01-01 | End: 2020-01-01

## 2020-01-01 RX ORDER — ROBINUL 0.2 MG/ML
0.2 INJECTION INTRAMUSCULAR; INTRAVENOUS EVERY 6 HOURS
Refills: 0 | Status: DISCONTINUED | OUTPATIENT
Start: 2020-01-01 | End: 2020-01-01

## 2020-01-01 RX ORDER — SODIUM CHLORIDE 9 MG/ML
1000 INJECTION, SOLUTION INTRAVENOUS
Refills: 0 | Status: DISCONTINUED | OUTPATIENT
Start: 2020-01-01 | End: 2020-01-01

## 2020-01-01 RX ORDER — ACETAMINOPHEN 500 MG
650 TABLET ORAL EVERY 6 HOURS
Refills: 0 | Status: DISCONTINUED | OUTPATIENT
Start: 2020-01-01 | End: 2020-01-01

## 2020-01-01 RX ORDER — METOPROLOL TARTRATE 50 MG
12.5 TABLET ORAL DAILY
Refills: 0 | Status: DISCONTINUED | OUTPATIENT
Start: 2020-01-01 | End: 2020-01-01

## 2020-01-01 RX ORDER — POTASSIUM CHLORIDE 20 MEQ
10 PACKET (EA) ORAL
Refills: 0 | Status: COMPLETED | OUTPATIENT
Start: 2020-01-01 | End: 2020-01-01

## 2020-01-01 RX ORDER — POTASSIUM CHLORIDE 20 MEQ
10 PACKET (EA) ORAL
Refills: 0 | Status: DISCONTINUED | OUTPATIENT
Start: 2020-01-01 | End: 2020-01-01

## 2020-01-01 RX ORDER — ALBUTEROL 90 UG/1
3 AEROSOL, METERED ORAL ONCE
Refills: 0 | Status: COMPLETED | OUTPATIENT
Start: 2020-01-01 | End: 2020-01-01

## 2020-01-01 RX ORDER — VANCOMYCIN HCL 1 G
VIAL (EA) INTRAVENOUS
Refills: 0 | Status: DISCONTINUED | OUTPATIENT
Start: 2020-01-01 | End: 2020-01-01

## 2020-01-01 RX ORDER — ROBINUL 0.2 MG/ML
0 INJECTION INTRAMUSCULAR; INTRAVENOUS
Qty: 0 | Refills: 0 | DISCHARGE
Start: 2020-01-01

## 2020-01-01 RX ORDER — HALOPERIDOL DECANOATE 100 MG/ML
0.5 INJECTION INTRAMUSCULAR EVERY 8 HOURS
Refills: 0 | Status: DISCONTINUED | OUTPATIENT
Start: 2020-01-01 | End: 2020-01-01

## 2020-01-01 RX ORDER — NIFEDIPINE 30 MG
30 TABLET, EXTENDED RELEASE 24 HR ORAL DAILY
Refills: 0 | Status: DISCONTINUED | OUTPATIENT
Start: 2020-01-01 | End: 2020-01-01

## 2020-01-01 RX ORDER — VANCOMYCIN HCL 1 G
1000 VIAL (EA) INTRAVENOUS EVERY 24 HOURS
Refills: 0 | Status: DISCONTINUED | OUTPATIENT
Start: 2020-01-01 | End: 2020-01-01

## 2020-01-01 RX ORDER — METOPROLOL TARTRATE 50 MG
2.5 TABLET ORAL EVERY 6 HOURS
Refills: 0 | Status: DISCONTINUED | OUTPATIENT
Start: 2020-01-01 | End: 2020-01-01

## 2020-01-01 RX ORDER — AZITHROMYCIN 500 MG/1
500 TABLET, FILM COATED ORAL ONCE
Refills: 0 | Status: COMPLETED | OUTPATIENT
Start: 2020-01-01 | End: 2020-01-01

## 2020-01-01 RX ORDER — DILTIAZEM HCL 120 MG
10 CAPSULE, EXT RELEASE 24 HR ORAL ONCE
Refills: 0 | Status: COMPLETED | OUTPATIENT
Start: 2020-01-01 | End: 2020-01-01

## 2020-01-01 RX ORDER — LOSARTAN/HYDROCHLOROTHIAZIDE 100MG-25MG
1 TABLET ORAL
Qty: 0 | Refills: 0 | DISCHARGE

## 2020-01-01 RX ORDER — CEFTRIAXONE 500 MG/1
1000 INJECTION, POWDER, FOR SOLUTION INTRAMUSCULAR; INTRAVENOUS EVERY 24 HOURS
Refills: 0 | Status: DISCONTINUED | OUTPATIENT
Start: 2020-01-01 | End: 2020-01-01

## 2020-01-01 RX ORDER — HYDROMORPHONE HYDROCHLORIDE 2 MG/ML
0.5 INJECTION INTRAMUSCULAR; INTRAVENOUS; SUBCUTANEOUS
Refills: 0 | Status: DISCONTINUED | OUTPATIENT
Start: 2020-01-01 | End: 2020-01-01

## 2020-01-01 RX ORDER — HEPARIN SODIUM 5000 [USP'U]/ML
5000 INJECTION INTRAVENOUS; SUBCUTANEOUS EVERY 8 HOURS
Refills: 0 | Status: DISCONTINUED | OUTPATIENT
Start: 2020-01-01 | End: 2020-01-01

## 2020-01-01 RX ORDER — METOPROLOL TARTRATE 50 MG
5 TABLET ORAL EVERY 6 HOURS
Refills: 0 | Status: DISCONTINUED | OUTPATIENT
Start: 2020-01-01 | End: 2020-01-01

## 2020-01-01 RX ORDER — POTASSIUM CHLORIDE 20 MEQ
10 PACKET (EA) ORAL ONCE
Refills: 0 | Status: DISCONTINUED | OUTPATIENT
Start: 2020-01-01 | End: 2020-01-01

## 2020-01-01 RX ORDER — CEFTRIAXONE 500 MG/1
1000 INJECTION, POWDER, FOR SOLUTION INTRAMUSCULAR; INTRAVENOUS ONCE
Refills: 0 | Status: COMPLETED | OUTPATIENT
Start: 2020-01-01 | End: 2020-01-01

## 2020-01-01 RX ORDER — FUROSEMIDE 40 MG
40 TABLET ORAL ONCE
Refills: 0 | Status: COMPLETED | OUTPATIENT
Start: 2020-01-01 | End: 2020-01-01

## 2020-01-01 RX ORDER — FUROSEMIDE 40 MG
40 TABLET ORAL EVERY 12 HOURS
Refills: 0 | Status: DISCONTINUED | OUTPATIENT
Start: 2020-01-01 | End: 2020-01-01

## 2020-01-01 RX ORDER — VANCOMYCIN HCL 1 G
750 VIAL (EA) INTRAVENOUS EVERY 24 HOURS
Refills: 0 | Status: DISCONTINUED | OUTPATIENT
Start: 2020-01-01 | End: 2020-01-01

## 2020-01-01 RX ORDER — CEFEPIME 1 G/1
INJECTION, POWDER, FOR SOLUTION INTRAMUSCULAR; INTRAVENOUS
Refills: 0 | Status: DISCONTINUED | OUTPATIENT
Start: 2020-01-01 | End: 2020-01-01

## 2020-01-01 RX ORDER — FUROSEMIDE 40 MG
40 TABLET ORAL EVERY 24 HOURS
Refills: 0 | Status: DISCONTINUED | OUTPATIENT
Start: 2020-01-01 | End: 2020-01-01

## 2020-01-01 RX ORDER — ROBINUL 0.2 MG/ML
0.4 INJECTION INTRAMUSCULAR; INTRAVENOUS EVERY 6 HOURS
Refills: 0 | Status: DISCONTINUED | OUTPATIENT
Start: 2020-01-01 | End: 2020-01-01

## 2020-01-01 RX ORDER — VERAPAMIL HCL 240 MG
1 CAPSULE, EXTENDED RELEASE PELLETS 24 HR ORAL
Qty: 0 | Refills: 0 | DISCHARGE

## 2020-01-01 RX ORDER — ACETAMINOPHEN 500 MG
1 TABLET ORAL
Qty: 0 | Refills: 0 | DISCHARGE
Start: 2020-01-01

## 2020-01-01 RX ORDER — AZITHROMYCIN 500 MG/1
500 TABLET, FILM COATED ORAL EVERY 24 HOURS
Refills: 0 | Status: DISCONTINUED | OUTPATIENT
Start: 2020-01-01 | End: 2020-01-01

## 2020-01-01 RX ORDER — METOPROLOL TARTRATE 50 MG
25 TABLET ORAL
Refills: 0 | Status: DISCONTINUED | OUTPATIENT
Start: 2020-01-01 | End: 2020-01-01

## 2020-01-01 RX ORDER — TAMSULOSIN HYDROCHLORIDE 0.4 MG/1
0.4 CAPSULE ORAL AT BEDTIME
Refills: 0 | Status: DISCONTINUED | OUTPATIENT
Start: 2020-01-01 | End: 2020-01-01

## 2020-01-01 RX ORDER — CEFEPIME 1 G/1
1000 INJECTION, POWDER, FOR SOLUTION INTRAMUSCULAR; INTRAVENOUS ONCE
Refills: 0 | Status: COMPLETED | OUTPATIENT
Start: 2020-01-01 | End: 2020-01-01

## 2020-01-01 RX ORDER — CEFEPIME 1 G/1
1000 INJECTION, POWDER, FOR SOLUTION INTRAMUSCULAR; INTRAVENOUS EVERY 24 HOURS
Refills: 0 | Status: DISCONTINUED | OUTPATIENT
Start: 2020-01-01 | End: 2020-01-01

## 2020-01-01 RX ORDER — LOSARTAN POTASSIUM 100 MG/1
50 TABLET, FILM COATED ORAL DAILY
Refills: 0 | Status: DISCONTINUED | OUTPATIENT
Start: 2020-01-01 | End: 2020-01-01

## 2020-01-01 RX ORDER — VANCOMYCIN HCL 1 G
750 VIAL (EA) INTRAVENOUS ONCE
Refills: 0 | Status: COMPLETED | OUTPATIENT
Start: 2020-01-01 | End: 2020-01-01

## 2020-01-01 RX ORDER — FUROSEMIDE 40 MG
40 TABLET ORAL DAILY
Refills: 0 | Status: DISCONTINUED | OUTPATIENT
Start: 2020-01-01 | End: 2020-01-01

## 2020-01-01 RX ORDER — FUROSEMIDE 40 MG
60 TABLET ORAL ONCE
Refills: 0 | Status: DISCONTINUED | OUTPATIENT
Start: 2020-01-01 | End: 2020-01-01

## 2020-01-01 RX ORDER — HYDROMORPHONE HYDROCHLORIDE 2 MG/ML
0 INJECTION INTRAMUSCULAR; INTRAVENOUS; SUBCUTANEOUS
Qty: 0 | Refills: 0 | DISCHARGE
Start: 2020-01-01

## 2020-01-01 RX ORDER — METOPROLOL TARTRATE 50 MG
25 TABLET ORAL ONCE
Refills: 0 | Status: COMPLETED | OUTPATIENT
Start: 2020-01-01 | End: 2020-01-01

## 2020-01-01 RX ORDER — METOPROLOL TARTRATE 50 MG
5 TABLET ORAL ONCE
Refills: 0 | Status: COMPLETED | OUTPATIENT
Start: 2020-01-01 | End: 2020-01-01

## 2020-01-01 RX ADMIN — Medication 40 MILLIGRAM(S): at 14:38

## 2020-01-01 RX ADMIN — Medication 100 MILLIEQUIVALENT(S): at 11:12

## 2020-01-01 RX ADMIN — SODIUM CHLORIDE 100 MILLILITER(S): 9 INJECTION, SOLUTION INTRAVENOUS at 10:38

## 2020-01-01 RX ADMIN — CEFEPIME 100 MILLIGRAM(S): 1 INJECTION, POWDER, FOR SOLUTION INTRAMUSCULAR; INTRAVENOUS at 08:36

## 2020-01-01 RX ADMIN — HEPARIN SODIUM 5000 UNIT(S): 5000 INJECTION INTRAVENOUS; SUBCUTANEOUS at 21:49

## 2020-01-01 RX ADMIN — HEPARIN SODIUM 5000 UNIT(S): 5000 INJECTION INTRAVENOUS; SUBCUTANEOUS at 05:21

## 2020-01-01 RX ADMIN — Medication 100 MILLIEQUIVALENT(S): at 00:02

## 2020-01-01 RX ADMIN — Medication 1 MILLIGRAM(S): at 11:56

## 2020-01-01 RX ADMIN — HYDROMORPHONE HYDROCHLORIDE 0.5 MILLIGRAM(S): 2 INJECTION INTRAMUSCULAR; INTRAVENOUS; SUBCUTANEOUS at 15:17

## 2020-01-01 RX ADMIN — HEPARIN SODIUM 5000 UNIT(S): 5000 INJECTION INTRAVENOUS; SUBCUTANEOUS at 05:54

## 2020-01-01 RX ADMIN — HEPARIN SODIUM 5000 UNIT(S): 5000 INJECTION INTRAVENOUS; SUBCUTANEOUS at 05:51

## 2020-01-01 RX ADMIN — HALOPERIDOL DECANOATE 0.5 MILLIGRAM(S): 100 INJECTION INTRAMUSCULAR at 21:53

## 2020-01-01 RX ADMIN — HYDROMORPHONE HYDROCHLORIDE 0.5 MILLIGRAM(S): 2 INJECTION INTRAMUSCULAR; INTRAVENOUS; SUBCUTANEOUS at 17:14

## 2020-01-01 RX ADMIN — Medication 650 MILLIGRAM(S): at 02:26

## 2020-01-01 RX ADMIN — HYDROMORPHONE HYDROCHLORIDE 0.5 MILLIGRAM(S): 2 INJECTION INTRAMUSCULAR; INTRAVENOUS; SUBCUTANEOUS at 19:59

## 2020-01-01 RX ADMIN — HYDROMORPHONE HYDROCHLORIDE 0.5 MILLIGRAM(S): 2 INJECTION INTRAMUSCULAR; INTRAVENOUS; SUBCUTANEOUS at 05:22

## 2020-01-01 RX ADMIN — HEPARIN SODIUM 5000 UNIT(S): 5000 INJECTION INTRAVENOUS; SUBCUTANEOUS at 14:58

## 2020-01-01 RX ADMIN — Medication 1 MILLIGRAM(S): at 11:22

## 2020-01-01 RX ADMIN — Medication 25 MILLIGRAM(S): at 05:58

## 2020-01-01 RX ADMIN — Medication 25 MILLIGRAM(S): at 09:20

## 2020-01-01 RX ADMIN — Medication 100 MILLIEQUIVALENT(S): at 11:21

## 2020-01-01 RX ADMIN — HEPARIN SODIUM 5000 UNIT(S): 5000 INJECTION INTRAVENOUS; SUBCUTANEOUS at 14:42

## 2020-01-01 RX ADMIN — Medication 2.5 MILLIGRAM(S): at 05:36

## 2020-01-01 RX ADMIN — HYDROMORPHONE HYDROCHLORIDE 0.5 MILLIGRAM(S): 2 INJECTION INTRAMUSCULAR; INTRAVENOUS; SUBCUTANEOUS at 03:32

## 2020-01-01 RX ADMIN — HEPARIN SODIUM 5000 UNIT(S): 5000 INJECTION INTRAVENOUS; SUBCUTANEOUS at 13:10

## 2020-01-01 RX ADMIN — SODIUM CHLORIDE 100 MILLILITER(S): 9 INJECTION, SOLUTION INTRAVENOUS at 10:03

## 2020-01-01 RX ADMIN — HEPARIN SODIUM 5000 UNIT(S): 5000 INJECTION INTRAVENOUS; SUBCUTANEOUS at 06:29

## 2020-01-01 RX ADMIN — Medication 5 MILLIGRAM(S): at 11:02

## 2020-01-01 RX ADMIN — AZITHROMYCIN 255 MILLIGRAM(S): 500 TABLET, FILM COATED ORAL at 13:10

## 2020-01-01 RX ADMIN — AZITHROMYCIN 255 MILLIGRAM(S): 500 TABLET, FILM COATED ORAL at 11:56

## 2020-01-01 RX ADMIN — HEPARIN SODIUM 5000 UNIT(S): 5000 INJECTION INTRAVENOUS; SUBCUTANEOUS at 21:32

## 2020-01-01 RX ADMIN — HYDROMORPHONE HYDROCHLORIDE 0.5 MILLIGRAM(S): 2 INJECTION INTRAMUSCULAR; INTRAVENOUS; SUBCUTANEOUS at 11:06

## 2020-01-01 RX ADMIN — Medication 12.5 MILLIGRAM(S): at 06:29

## 2020-01-01 RX ADMIN — HYDROMORPHONE HYDROCHLORIDE 0.5 MILLIGRAM(S): 2 INJECTION INTRAMUSCULAR; INTRAVENOUS; SUBCUTANEOUS at 23:16

## 2020-01-01 RX ADMIN — SODIUM CHLORIDE 75 MILLILITER(S): 9 INJECTION, SOLUTION INTRAVENOUS at 09:37

## 2020-01-01 RX ADMIN — HYDROMORPHONE HYDROCHLORIDE 0.5 MILLIGRAM(S): 2 INJECTION INTRAMUSCULAR; INTRAVENOUS; SUBCUTANEOUS at 20:51

## 2020-01-01 RX ADMIN — Medication 12.5 MILLIGRAM(S): at 17:52

## 2020-01-01 RX ADMIN — CEFTRIAXONE 100 MILLIGRAM(S): 500 INJECTION, POWDER, FOR SOLUTION INTRAMUSCULAR; INTRAVENOUS at 11:56

## 2020-01-01 RX ADMIN — HYDROMORPHONE HYDROCHLORIDE 0.5 MILLIGRAM(S): 2 INJECTION INTRAMUSCULAR; INTRAVENOUS; SUBCUTANEOUS at 02:19

## 2020-01-01 RX ADMIN — Medication 12.5 MILLIGRAM(S): at 06:02

## 2020-01-01 RX ADMIN — HYDROMORPHONE HYDROCHLORIDE 0.5 MILLIGRAM(S): 2 INJECTION INTRAMUSCULAR; INTRAVENOUS; SUBCUTANEOUS at 11:21

## 2020-01-01 RX ADMIN — Medication 12.5 MILLIGRAM(S): at 17:24

## 2020-01-01 RX ADMIN — Medication 1 MILLIGRAM(S): at 18:00

## 2020-01-01 RX ADMIN — HYDROMORPHONE HYDROCHLORIDE 0.5 MILLIGRAM(S): 2 INJECTION INTRAMUSCULAR; INTRAVENOUS; SUBCUTANEOUS at 22:52

## 2020-01-01 RX ADMIN — HEPARIN SODIUM 5000 UNIT(S): 5000 INJECTION INTRAVENOUS; SUBCUTANEOUS at 05:58

## 2020-01-01 RX ADMIN — HEPARIN SODIUM 5000 UNIT(S): 5000 INJECTION INTRAVENOUS; SUBCUTANEOUS at 14:00

## 2020-01-01 RX ADMIN — SODIUM CHLORIDE 100 MILLILITER(S): 9 INJECTION, SOLUTION INTRAVENOUS at 01:02

## 2020-01-01 RX ADMIN — CEFEPIME 100 MILLIGRAM(S): 1 INJECTION, POWDER, FOR SOLUTION INTRAMUSCULAR; INTRAVENOUS at 08:56

## 2020-01-01 RX ADMIN — Medication 40 MILLIGRAM(S): at 05:36

## 2020-01-01 RX ADMIN — HEPARIN SODIUM 5000 UNIT(S): 5000 INJECTION INTRAVENOUS; SUBCUTANEOUS at 13:21

## 2020-01-01 RX ADMIN — LOSARTAN POTASSIUM 50 MILLIGRAM(S): 100 TABLET, FILM COATED ORAL at 06:29

## 2020-01-01 RX ADMIN — SODIUM CHLORIDE 500 MILLILITER(S): 9 INJECTION, SOLUTION INTRAVENOUS at 10:38

## 2020-01-01 RX ADMIN — ROBINUL 0.4 MILLIGRAM(S): 0.2 INJECTION INTRAMUSCULAR; INTRAVENOUS at 05:22

## 2020-01-01 RX ADMIN — Medication 12.5 MILLIGRAM(S): at 17:53

## 2020-01-01 RX ADMIN — Medication 2.5 MILLIGRAM(S): at 01:52

## 2020-01-01 RX ADMIN — CEFEPIME 100 MILLIGRAM(S): 1 INJECTION, POWDER, FOR SOLUTION INTRAMUSCULAR; INTRAVENOUS at 11:31

## 2020-01-01 RX ADMIN — ALBUTEROL 3 PUFF(S): 90 AEROSOL, METERED ORAL at 11:50

## 2020-01-01 RX ADMIN — Medication 100 MILLIEQUIVALENT(S): at 14:09

## 2020-01-01 RX ADMIN — Medication 12.5 MILLIGRAM(S): at 05:51

## 2020-01-01 RX ADMIN — HEPARIN SODIUM 5000 UNIT(S): 5000 INJECTION INTRAVENOUS; SUBCUTANEOUS at 11:32

## 2020-01-01 RX ADMIN — Medication 250 MILLIGRAM(S): at 09:28

## 2020-01-01 RX ADMIN — Medication 5 MILLIGRAM(S): at 08:25

## 2020-01-01 RX ADMIN — Medication 10 MILLIGRAM(S): at 12:28

## 2020-01-01 RX ADMIN — HEPARIN SODIUM 5000 UNIT(S): 5000 INJECTION INTRAVENOUS; SUBCUTANEOUS at 21:40

## 2020-01-01 RX ADMIN — SODIUM CHLORIDE 75 MILLILITER(S): 9 INJECTION, SOLUTION INTRAVENOUS at 10:47

## 2020-01-01 RX ADMIN — Medication 250 MILLIGRAM(S): at 08:59

## 2020-01-01 RX ADMIN — Medication 250 MILLIGRAM(S): at 10:05

## 2020-01-01 RX ADMIN — Medication 1 MILLIGRAM(S): at 22:51

## 2020-01-01 RX ADMIN — Medication 100 MILLIEQUIVALENT(S): at 12:11

## 2020-01-01 RX ADMIN — HEPARIN SODIUM 5000 UNIT(S): 5000 INJECTION INTRAVENOUS; SUBCUTANEOUS at 21:33

## 2020-01-01 RX ADMIN — HEPARIN SODIUM 5000 UNIT(S): 5000 INJECTION INTRAVENOUS; SUBCUTANEOUS at 05:36

## 2020-01-01 RX ADMIN — LOSARTAN POTASSIUM 50 MILLIGRAM(S): 100 TABLET, FILM COATED ORAL at 05:54

## 2020-01-01 RX ADMIN — Medication 40 MILLIGRAM(S): at 17:46

## 2020-01-01 RX ADMIN — HEPARIN SODIUM 5000 UNIT(S): 5000 INJECTION INTRAVENOUS; SUBCUTANEOUS at 06:03

## 2020-01-01 RX ADMIN — HEPARIN SODIUM 5000 UNIT(S): 5000 INJECTION INTRAVENOUS; SUBCUTANEOUS at 00:14

## 2020-01-01 RX ADMIN — Medication 100 MILLIEQUIVALENT(S): at 15:09

## 2020-01-01 RX ADMIN — Medication 1 MILLIGRAM(S): at 05:22

## 2020-01-01 RX ADMIN — HEPARIN SODIUM 5000 UNIT(S): 5000 INJECTION INTRAVENOUS; SUBCUTANEOUS at 21:56

## 2020-01-01 RX ADMIN — Medication 100 MILLIEQUIVALENT(S): at 09:52

## 2020-01-01 RX ADMIN — Medication 2.5 MILLIGRAM(S): at 17:46

## 2020-01-01 RX ADMIN — Medication 40 MILLIGRAM(S): at 11:17

## 2020-01-01 RX ADMIN — Medication 100 MILLIEQUIVALENT(S): at 13:08

## 2020-01-01 RX ADMIN — Medication 250 MILLIGRAM(S): at 08:56

## 2020-01-01 RX ADMIN — Medication 1 MILLIGRAM(S): at 06:53

## 2020-01-01 RX ADMIN — Medication 1 MILLIGRAM(S): at 02:06

## 2020-01-01 RX ADMIN — Medication 25 MILLIGRAM(S): at 19:42

## 2020-01-01 RX ADMIN — ROBINUL 0.4 MILLIGRAM(S): 0.2 INJECTION INTRAMUSCULAR; INTRAVENOUS at 03:31

## 2020-01-01 RX ADMIN — TAMSULOSIN HYDROCHLORIDE 0.4 MILLIGRAM(S): 0.4 CAPSULE ORAL at 21:40

## 2020-01-01 RX ADMIN — Medication 1 MILLIGRAM(S): at 03:31

## 2020-01-01 RX ADMIN — CEFEPIME 100 MILLIGRAM(S): 1 INJECTION, POWDER, FOR SOLUTION INTRAMUSCULAR; INTRAVENOUS at 11:02

## 2020-01-01 RX ADMIN — Medication 1 MILLIGRAM(S): at 15:01

## 2020-01-01 RX ADMIN — Medication 12.5 MILLIGRAM(S): at 05:54

## 2020-01-01 RX ADMIN — CEFTRIAXONE 100 MILLIGRAM(S): 500 INJECTION, POWDER, FOR SOLUTION INTRAMUSCULAR; INTRAVENOUS at 13:10

## 2020-01-01 RX ADMIN — LOSARTAN POTASSIUM 50 MILLIGRAM(S): 100 TABLET, FILM COATED ORAL at 17:53

## 2020-01-01 RX ADMIN — HEPARIN SODIUM 5000 UNIT(S): 5000 INJECTION INTRAVENOUS; SUBCUTANEOUS at 21:50

## 2020-01-01 RX ADMIN — CEFEPIME 100 MILLIGRAM(S): 1 INJECTION, POWDER, FOR SOLUTION INTRAMUSCULAR; INTRAVENOUS at 09:28

## 2020-01-01 RX ADMIN — Medication 12.5 MILLIGRAM(S): at 17:08

## 2020-01-01 RX ADMIN — Medication 100 MILLIEQUIVALENT(S): at 09:34

## 2020-01-01 RX ADMIN — Medication 250 MILLIGRAM(S): at 08:36

## 2020-01-01 RX ADMIN — CEFEPIME 100 MILLIGRAM(S): 1 INJECTION, POWDER, FOR SOLUTION INTRAMUSCULAR; INTRAVENOUS at 08:59

## 2020-01-01 RX ADMIN — TAMSULOSIN HYDROCHLORIDE 0.4 MILLIGRAM(S): 0.4 CAPSULE ORAL at 21:33

## 2020-01-01 RX ADMIN — HYDROMORPHONE HYDROCHLORIDE 0.5 MILLIGRAM(S): 2 INJECTION INTRAMUSCULAR; INTRAVENOUS; SUBCUTANEOUS at 05:18

## 2020-01-01 RX ADMIN — SODIUM CHLORIDE 75 MILLILITER(S): 9 INJECTION, SOLUTION INTRAVENOUS at 17:53

## 2020-01-01 RX ADMIN — Medication 250 MILLIGRAM(S): at 11:01

## 2020-01-01 RX ADMIN — Medication 100 MILLIEQUIVALENT(S): at 10:34

## 2020-06-03 NOTE — ED ADULT NURSE NOTE - NSIMPLEMENTINTERV_GEN_ALL_ED
Implemented All Fall with Harm Risk Interventions:  [place] to call system. Call bell, personal items and telephone within reach. Instruct patient to call for assistance. Room bathroom lighting operational. Non-slip footwear when patient is off stretcher. Physically safe environment: no spills, clutter or unnecessary equipment. Stretcher in lowest position, wheels locked, appropriate side rails in place. Provide visual cue, wrist band, yellow gown, etc. Monitor gait and stability. Monitor for mental status changes and reorient to person, place, and time. Review medications for side effects contributing to fall risk. Reinforce activity limits and safety measures with patient and family. Provide visual clues: red socks.

## 2020-06-03 NOTE — H&P ADULT - ASSESSMENT
89 y/o female admitted to telemetry for acute hypoxic respiratory failure possibly 2/2 to acute heart failure vs COVID19 pneumonia. Patient clinically appears volume overloaded on examination and EKG shows new onset afib.

## 2020-06-03 NOTE — H&P ADULT - PROBLEM SELECTOR PLAN 7
IMPROVE VTE Individual Risk Assessment          RISK                                                          Points  [  ] Previous VTE                                                3  [  ] Thrombophilia                                             2  [  ] Lower limb paralysis                                   2        (unable to hold up >15 seconds)    [  ] Current Cancer                                             2         (within 6 months)  [ X ] Immobilization > 24 hrs                              1  [  ] ICU/CCU stay > 24 hours                             1  [ X ] Age > 60                                                         1    IMPROVE VTE Score: 2    subq heparin for dvt ppx

## 2020-06-03 NOTE — H&P ADULT - PROBLEM SELECTOR PLAN 3
creatinine 1.49 on admission  likely 2/2 to decompensated CHF  per daughter, patient has no hx of renal disease  avoid nephrotoxins  f/u urine lytes  bmp daily  management as above

## 2020-06-03 NOTE — ED PROVIDER NOTE - OBJECTIVE STATEMENT
89 yo female with no PMHx, presents after fall at home. Patient was on the floor for 5 hours where she was found by daughter who also related 3 falls in the past 2 days to EMS. Patient noted to have O2 saturation in 70s which improved with face mask. Currently patient reports "breast pain." Patient denies any fever or cough.

## 2020-06-03 NOTE — CHART NOTE - NSCHARTNOTEFT_GEN_A_CORE
EVENT: Called by nurse to assess pt for agitation, pulling off NRM and putting legs off the bed    BRIEF HPI: 89 y/o female from home with PMHx of HTN presents to the ED after a fall at home. As per daughter, patient has had three mechanical falls since Monday, with most recent fall being this morning. Per EMS, patient's O2 sat was in the 70s when she was found, and she was placed on NRB mask. At present, patient is in moderate respiratory distress with tachypnea. She is maintaining an O2 saturation of 97% on 100% NRB. Pt groaning loudly and pulling NRM off now. Patient is DNR/DNI.    Vital Signs Last 24 Hrs  T(C): 36.8 (03 Jun 2020 19:08), Max: 36.8 (03 Jun 2020 19:08)  T(F): 98.3 (03 Jun 2020 19:08), Max: 98.3 (03 Jun 2020 19:08)  HR: 82 (03 Jun 2020 19:08) (81 - 103)  BP: 173/66 (03 Jun 2020 19:08) (168/68 - 207/77)  BP(mean): --  RR: 18 (03 Jun 2020 19:08) (18 - 32)  SpO2: 94% (03 Jun 2020 19:08) (94% - 100%)    FOCUSED P/E:  NEURO: Awake, appears visually impaired, groaning, nonverbal  EXT: AROM UE                        11.5   17.59 )-----------( 287      ( 03 Jun 2020 11:12 )             37.2   06-03    141  |  109<H>  |  46<H>  ----------------------------<  115<H>  3.4<L>   |  21<L>  |  1.49<H>    Ca    9.8      03 Jun 2020 11:12    TPro  8.6<H>  /  Alb  4.2  /  TBili  1.3<H>  /  DBili  x   /  AST  44<H>  /  ALT  27  /  AlkPhos  127<H>  06-03    Troponin I, Serum: 0.254  ng/mL (06.03.20 @ 19:15)  Troponin I, Serum: 0.149 ng/mL (06.03.20 @ 11:12)    PROBLEM: Agitation probably due t  to  PLAN:  1. Bilateral unsecured mittens to prevent pulling NRM EVENT: Called by nurse to assess pt for agitation, pulling off NRM and putting legs off the bed    BRIEF HPI: 89 y/o female from home with PMHx of HTN presents to the ED after a fall at home. As per daughter, patient has had three mechanical falls since Monday, with most recent fall being this morning. Per EMS, patient's O2 sat was in the 70s when she was found, and she was placed on NRB mask. At present, patient is in moderate respiratory distress with tachypnea. She is maintaining an O2 saturation of 97% on 100% NRB. Pt groaning loudly and pulling NRM off now. Patient is DNR/DNI.    Vital Signs Last 24 Hrs  T(C): 36.8 (03 Jun 2020 19:08), Max: 36.8 (03 Jun 2020 19:08)  T(F): 98.3 (03 Jun 2020 19:08), Max: 98.3 (03 Jun 2020 19:08)  HR: 82 (03 Jun 2020 19:08) (81 - 103)  BP: 173/66 (03 Jun 2020 19:08) (168/68 - 207/77)  BP(mean): --  RR: 18 (03 Jun 2020 19:08) (18 - 32)  SpO2: 94% (03 Jun 2020 19:08) (94% - 100%)    FOCUSED P/E:  NEURO: Awake, appears visually impaired, groaning, nonverbal  EXT: AROM UE                        11.5   17.59 )-----------( 287      ( 03 Jun 2020 11:12 )             37.2   06-03    141  |  109<H>  |  46<H>  ----------------------------<  115<H>  3.4<L>   |  21<L>  |  1.49<H>    Ca    9.8      03 Jun 2020 11:12    TPro  8.6<H>  /  Alb  4.2  /  TBili  1.3<H>  /  DBili  x   /  AST  44<H>  /  ALT  27  /  AlkPhos  127<H>  06-03    Troponin I, Serum: 0.254  ng/mL (06.03.20 @ 19:15)  Troponin I, Serum: 0.149 ng/mL (06.03.20 @ 11:12)    PROBLEM: Agitation probably due t  to  PLAN:  1. Bilateral unsecured mittens to prevent pulling NRM  MEDICATIONS  (STANDING):  furosemide   Injectable 40 milliGRAM(s) IV Push every 12 hours  heparin   Injectable 5000 Unit(s) SubCutaneous every 8 hours  metoprolol tartrate Injectable 2.5 milliGRAM(s) IV Push every 6 hours    MEDICATIONS  (PRN):  haloperidol    Injectable 0.5 milliGRAM(s) IV Push every 8 hours PRN Extreme agitation EVENT: Called by nurse to assess pt for agitation, pulling off NRM and putting legs through the side rails despite verbal intervention and redirecting    BRIEF HPI: 89 y/o female from home with PMHx of HTN presents to the ED after a fall at home. As per daughter, patient has had three mechanical falls since Monday, with most recent fall being this morning. Per EMS, patient's O2 sat was in the 70s when she was found, and she was placed on NRB mask. At present, patient is in moderate respiratory distress with tachypnea. She is maintaining an O2 saturation of 97% on 100% NRB. Patient is DNR/DNI. Pt groaning loudly and pulling NRM off now, in EDHL awaiting bed assignment on telemetry.    Vital Signs Last 24 Hrs  T(C): 36.8 (03 Jun 2020 19:08), Max: 36.8 (03 Jun 2020 19:08)  T(F): 98.3 (03 Jun 2020 19:08), Max: 98.3 (03 Jun 2020 19:08)  HR: 82 (03 Jun 2020 19:08) (81 - 103)  BP: 173/66 (03 Jun 2020 19:08) (168/68 - 207/77)  BP(mean): --  RR: 18 (03 Jun 2020 19:08) (18 - 32)  SpO2: 94% (03 Jun 2020 19:08) (94% - 100%)    FOCUSED P/E:  NEURO: Awake, appears visually impaired, loud,  incomprehensible speech  RESP: Labored, use of accessory muscles  EXT: AROM bilateral upper extremities, slower ROM lower extremities                        11.5   17.59 )-----------( 287      ( 03 Jun 2020 11:12 )             37.2   06-03    141  |  109<H>  |  46<H>  ----------------------------<  115<H>  3.4<L>   |  21<L>  |  1.49<H>    Ca    9.8      03 Jun 2020 11:12    TPro  8.6<H>  /  Alb  4.2  /  TBili  1.3<H>  /  DBili  x   /  AST  44<H>  /  ALT  27  /  AlkPhos  127<H>  06-03    Troponin I, Serum: 0.254  ng/mL (06.03.20 @ 19:15)  Troponin I, Serum: 0.149 ng/mL (06.03.20 @ 11:12)    PROBLEM: Agitation probably due to pain vs dementia vs sundowning  PLAN:  1. Bilateral unsecured mittens to prevent pulling NRM off  2. Non verbal pain score and reassess per policy   3. Acetaminophen  Suppository 650 chantel GRAM(s) Rectal every 6 hours PRN Mild Pain (1 - 3), Moderate Pain (4 - 6), Severe Pain (7 - 10),   4. Haloperidol Injectable 0.5 chantel GRAM(s) IV Push every 8 hours PRN Extreme agitation EVENT: Called by nurse to assess pt for agitation, pulling off NRM and putting legs through the side rails despite verbal intervention and redirecting    BRIEF HPI: 89 y/o female from home with PMHx of HTN presents to the ED after a fall at home. As per daughter, patient has had three mechanical falls since Monday, with most recent fall being this morning. Per EMS, patient's O2 sat was in the 70s when she was found, and she was placed on NRB mask. At present, patient is in moderate respiratory distress with tachypnea. She is maintaining an O2 saturation of 97% on 100% NRB. Patient is DNR/DNI. Pt groaning loudly and pulling NRM off now, in EDHL awaiting bed assignment on telemetry.    Vital Signs Last 24 Hrs  T(C): 36.8 (03 Jun 2020 19:08), Max: 36.8 (03 Jun 2020 19:08)  T(F): 98.3 (03 Jun 2020 19:08), Max: 98.3 (03 Jun 2020 19:08)  HR: 82 (03 Jun 2020 19:08) (81 - 103)  BP: 173/66 (03 Jun 2020 19:08) (168/68 - 207/77)  BP(mean): --  RR: 18 (03 Jun 2020 19:08) (18 - 32)  SpO2: 94% (03 Jun 2020 19:08) (94% - 100%)    FOCUSED P/E:  NEURO: Awake, appears visually impaired, loud,  incomprehensible speech  RESP: Labored, use of accessory muscles  EXT: AROM at extremities               11.5   17.59 )-----------( 287      ( 03 Jun 2020 11:12 )             37.2   06-03    141  |  109<H>  |  46<H>  ----------------------------<  115<H>  3.4<L>   |  21<L>  |  1.49<H>    Ca    9.8      03 Jun 2020 11:12    TPro  8.6<H>  /  Alb  4.2  /  TBili  1.3<H>  /  DBili  x   /  AST  44<H>  /  ALT  27  /  AlkPhos  127<H>  06-03    Troponin I, Serum: 0.254  ng/mL (06.03.20 @ 19:15)  Troponin I, Serum: 0.149 ng/mL (06.03.20 @ 11:12)    PROBLEM: Agitation probably due to pain vs dementia vs sundowning  PLAN:  1. Bilateral unsecured mittens to prevent pulling NRM off  2. Non verbal pain score and reassess per policy   3. Acetaminophen  Suppository 650 chantel GRAM(s) Rectal every 6 hours PRN Mild Pain (1 - 3), Moderate Pain (4 - 6), Severe Pain (7 - 10),   4. Haloperidol Injectable 0.5 chantel GRAM(s) IV Push every 8 hours PRN Extreme agitation EVENT: Called by nurse to assess pt for agitation, pulling off NRM and putting legs through the side rails despite verbal intervention and redirecting    BRIEF HPI: 89 y/o female from home with PMHx of HTN presents to the ED after a fall at home. As per daughter, patient has had three mechanical falls since Monday, with most recent fall being this morning. Per EMS, patient's O2 sat was in the 70s when she was found, and she was placed on NRB mask. At present, patient is in moderate respiratory distress with tachypnea. She is maintaining an O2 saturation of 97% on 100% NRB. Patient is DNR/DNI. Pt groaning loudly and pulling NRM off now, in EDHL awaiting bed assignment on telemetry.    Vital Signs Last 24 Hrs  T(C): 36.8 (03 Jun 2020 19:08), Max: 36.8 (03 Jun 2020 19:08)  T(F): 98.3 (03 Jun 2020 19:08), Max: 98.3 (03 Jun 2020 19:08)  HR: 82 (03 Jun 2020 19:08) (81 - 103)  BP: 173/66 (03 Jun 2020 19:08) (168/68 - 207/77)  BP(mean): --  RR: 18 (03 Jun 2020 19:08) (18 - 32)  SpO2: 94% (03 Jun 2020 19:08) (94% - 100%)    FOCUSED P/E:  NEURO: Awake, appears visually impaired, loud,  incomprehensible speech  RESP: Labored, use of accessory muscles  EXT: AROM at extremities               11.5   17.59 )-----------( 287      ( 03 Jun 2020 11:12 )             37.2   06-03    141  |  109<H>  |  46<H>  ----------------------------<  115<H>  3.4<L>   |  21<L>  |  1.49<H>    Ca    9.8      03 Jun 2020 11:12    TPro  8.6<H>  /  Alb  4.2  /  TBili  1.3<H>  /  DBili  x   /  AST  44<H>  /  ALT  27  /  AlkPhos  127<H>  06-03    Troponin I, Serum: 0.254  ng/mL (06.03.20 @ 19:15)  Troponin I, Serum: 0.149 ng/mL (06.03.20 @ 11:12)    EKG 6/3/20 10:46  Ventricular Rate 106 BPM  Atrial Rate 94 BPM  QRS Duration 104 ms  Q-T Interval 368 ms  QTC Calculation(Bezet) 488 ms  R Axis -34 degrees  T Axis 125 degrees  Diagnosis Line *** Poor data quality, interpretation may be adversely affected  Atrial fibrillation with rapid ventricular response  Left axis deviation  Minimal voltage criteria for LVH, may be normal variant  ST & T wave abnormality, consider lateral ischemia  Abnormal ECG  Confirmed by JOHANA MUÑOZ MD ([id number]) on 6/3/094603:00:47 PM    PROBLEM: Agitation probably due to pain vs dementia vs sundowning  PLAN:  1. Bilateral unsecured mittens to prevent pulling NRM off  2. Non verbal pain score and reassess per policy   3. Acetaminophen  Suppository 650 chantel GRAM(s) Rectal every 6 hours PRN Mild Pain (1 - 3), Moderate Pain (4 - 6), Severe Pain (7 - 10),   4. Haloperidol Injectable 0.5 chantel GRAM(s) IV Push every 8 hours PRN Extreme agitation EVENT: Called by nurse to assess pt for agitation, pulling off NRM and putting legs through the side rails despite verbal intervention and redirecting    BRIEF HPI: 89 y/o female from home with PMHx of HTN presents to the ED after a fall at home. As per daughter, patient has had three mechanical falls since Monday, with most recent fall being this morning. Per EMS, patient's O2 sat was in the 70s when she was found, and she was placed on NRB mask. At present, patient is in moderate respiratory distress with tachypnea. She is maintaining an O2 saturation of 97% on 100% NRB. Patient is DNR/DNI. Pt groaning loudly and pulling NRM off now, in EDHL awaiting bed assignment on medicine, high suspicion for Covid 19.    Vital Signs Last 24 Hrs  T(C): 36.8 (03 Jun 2020 19:08), Max: 36.8 (03 Jun 2020 19:08)  T(F): 98.3 (03 Jun 2020 19:08), Max: 98.3 (03 Jun 2020 19:08)  HR: 82 (03 Jun 2020 19:08) (81 - 103)  BP: 173/66 (03 Jun 2020 19:08) (168/68 - 207/77)  BP(mean): --  RR: 18 (03 Jun 2020 19:08) (18 - 32)  SpO2: 94% (03 Jun 2020 19:08) (94% - 100%)    FOCUSED P/E:  NEURO: Awake, appears visually impaired, loud,  incomprehensible speech  RESP: Labored, use of accessory muscles  EXT: AROM at extremities               11.5   17.59 )-----------( 287      ( 03 Jun 2020 11:12 )             37.2   06-03    141  |  109<H>  |  46<H>  ----------------------------<  115<H>  3.4<L>   |  21<L>  |  1.49<H>    Ca    9.8      03 Jun 2020 11:12    TPro  8.6<H>  /  Alb  4.2  /  TBili  1.3<H>  /  DBili  x   /  AST  44<H>  /  ALT  27  /  AlkPhos  127<H>  06-03    Troponin I, Serum: 0.254  ng/mL (06.03.20 @ 19:15)  Troponin I, Serum: 0.149 ng/mL (06.03.20 @ 11:12)    EKG 6/3/20 10:46  Ventricular Rate 106 BPM  Atrial Rate 94 BPM  QRS Duration 104 ms  Q-T Interval 368 ms  QTC Calculation(Bezet) 488 ms  R Axis -34 degrees  T Axis 125 degrees  Diagnosis Line *** Poor data quality, interpretation may be adversely affected  Atrial fibrillation with rapid ventricular response  Left axis deviation  Minimal voltage criteria for LVH, may be normal variant  ST & T wave abnormality, consider lateral ischemia  Abnormal ECG  Confirmed by JOHANA MUÑOZ MD ([id number]) on 6/3/059424:00:47 PM    PROBLEM: Agitation probably due to pain vs dementia vs sundowning  PLAN:  1. Bilateral unsecured mittens to prevent pulling NRM off  2. Non verbal pain score and reassess per policy   3. Acetaminophen  Suppository 650 chantel GRAM(s) Rectal every 6 hours PRN Mild Pain (1 - 3), Moderate Pain (4 - 6), Severe Pain (7 - 10),   4. Haloperidol Injectable 0.5 chantel GRAM(s) IV Push every 8 hours PRN Extreme agitation

## 2020-06-03 NOTE — H&P ADULT - PROBLEM SELECTOR PLAN 2
EKG on admission shows new onset afib with RVR  cardiac enzyme elevation noted - t1 0.149; likely demand ischemia  f/u t2 and t3 - trend to peak  f/u echo  tele monitoring  cardio consult  CHADSVASc score: 4  HAS-BLED score: 3  defer full dose ac as patient is a high fall risk EKG on admission shows new onset afib with RVR  s/p cardizem 10mg IV push x1 in ED  cardiac enzyme elevation noted - t1 0.149; likely demand ischemia  f/u t2 and t3 - trend to peak  f/u echo  lopressor 2.5mg IV push q6 for rate control  tele monitoring  cardio consult  CHADSVASc score: 4  HAS-BLED score: 3  defer full dose ac as patient is a high fall risk

## 2020-06-03 NOTE — H&P ADULT - PROBLEM SELECTOR PLAN 5
likely worsened by moderate respiratory distress  resuming home meds  monitor vitals likely worsened by moderate respiratory distress  no PO meds   monitor vitals likely worsened by moderate respiratory distress  no PO meds  lopressor IV push q6 w/ parameters  monitor vitals likely worsened by moderate respiratory distress  no PO meds  lopressor IV push q6 with parameters  monitor vitals

## 2020-06-03 NOTE — ED PROVIDER NOTE - CLINICAL SUMMARY MEDICAL DECISION MAKING FREE TEXT BOX
89 yo female here with frequent falls. Concern for infection, ACS, CHF, among other causes. Will perform labs, CXR, give IV Lasix and will reassess.

## 2020-06-03 NOTE — H&P ADULT - PROBLEM SELECTOR PLAN 4
presents s/p mechanical fall per daughter  f/u CT head to r/o overt intracranial pathology 2/2 to fall  fall precautions  PT consulted

## 2020-06-03 NOTE — H&P ADULT - NSHPPHYSICALEXAM_GEN_ALL_CORE
Vital Signs Last 24 Hrs  T(C): 36.6 (03 Jun 2020 10:52), Max: 36.6 (03 Jun 2020 10:52)  T(F): 97.8 (03 Jun 2020 10:52), Max: 97.8 (03 Jun 2020 10:52)  HR: 99 (03 Jun 2020 12:30) (82 - 103)  BP: 168/68 (03 Jun 2020 12:30) (168/68 - 207/77)  BP(mean): --  RR: 32 (03 Jun 2020 12:25) (22 - 32)  SpO2: 100% (03 Jun 2020 12:25) (98% - 100%)

## 2020-06-03 NOTE — ED PROVIDER NOTE - PHYSICAL EXAMINATION
Skin: 1 cm abrasion to left forehead. 1 cm superficial laceration to right dorsal hand overlying the second MC.

## 2020-06-03 NOTE — H&P ADULT - ATTENDING COMMENTS
Patient seen and examined; Agree with PGY2 A/P above with editing as needed. My independent assessment, findings on exam, diagnosis and plan of care as listed below. Discussed with Dr. TARYN muse    89 y/o female from home with PMHx of HTN presents to the ED after a fall at home. Patient was found to be hypoxic with significantly low oxygenation in ED and placed on 100% NRB mask.  As per daughter, patient has had three mechanical falls since Monday, with most recent fall being this morning. She denies noting any other issues or symptoms out of the ordinary.     Patient received intravenous lasix twice in ED with significant improvement and improvement in respiratory distress.  I placed her on 4 liter NC saturating 90% without using accessory muscle    ROS: as above  FH: Not pertinent to current presentation  SH: Lives with Daughter    Vital Signs Last 24 Hrs  T(C): 36.5 (03 Jun 2020 15:30), Max: 36.6 (03 Jun 2020 10:52)  T(F): 97.7 (03 Jun 2020 15:30), Max: 97.8 (03 Jun 2020 10:52)  HR: 81 (03 Jun 2020 15:30) (81 - 103)  BP: 188/75 (03 Jun 2020 15:30) (168/68 - 207/77)  RR: 22 (03 Jun 2020 15:30) (22 - 32)  SpO2: 100% (03 Jun 2020 15:30) (98% - 100%)    P/E:  elderly female, in no acute distress, mild respiratory distress (fast breathing)  Neuro: Alert, Awake, bruising over the face, followed simple commands  CVS: S1S2 present  Resp: BLAE+, No wheeze or Rhonchi    Labs:                        11.5   17.59 )-----------( 287      ( 03 Jun 2020 11:12 )             37.2   06-03    141  |  109<H>  |  46<H>  ----------------------------<  115<H>  3.4<L>   |  21<L>  |  1.49<H>    Ca    9.8      03 Jun 2020 11:12    TPro  8.6<H>  /  Alb  4.2  /  TBili  1.3<H>  /  DBili  x   /  AST  44<H>  /  ALT  27  /  AlkPhos  127<H>  06-03    D/D:  Acute Hypoxemic Respiratory failure likely due to vascular congestion concerning for heart failure precipitated by  Uncontrolled HTN  Suspected COVID-19 although less likely     Plan:  Telemetry; 2D echo; Serial cardiac enzymes  Supplemental Oxygen to keep saturation more than 92%  Place on 4 liter NC for now as patient improved; d/w RN if worsening SOB or saturation drop below 90% can consider NRB mask  IV lasix 40 mg twice daily x 24 hrs switch to once daily tomorrow  Lopressor 2.5 mg IV q 6 hrs routine; Hold if SBP less than 130 mm hg  Ecotrin/ Statin  DVT ppx with heparin  Cardio Dr. Vega  [facility]: PGY2 d/w daughter: DNR status    Discussed with PGY2 DANIEL Muse and ED Hold RN Diamond SANCHEZ d/w family and updated findings and plan of care Patient seen and examined; Agree with PGY2 A/P above with editing as needed. My independent assessment, findings on exam, diagnosis and plan of care as listed below. Discussed with Dr. TARYN Asher    89 y/o female from home with PMH of HTN presents to the ED after a fall at home. Patient with multiple falls recently. Patient was found to be hypoxic with significantly low oxygenation in ED and placed on 100% NRB mask.  As per daughter, patient has had three mechanical falls since Monday, with most recent fall being this morning. She denies noting any other issues or symptoms out of the ordinary.     Patient received intravenous lasix twice in ED with significant improvement and improvement in respiratory distress.  I placed her on 4 liter NC saturating 90% without using accessory muscle.    ROS: as above  FH: Not pertinent to current presentation  SH: Lives with Daughter    Vital Signs Last 24 Hrs  T(C): 36.5 (03 Jun 2020 15:30), Max: 36.6 (03 Jun 2020 10:52)  T(F): 97.7 (03 Jun 2020 15:30), Max: 97.8 (03 Jun 2020 10:52)  HR: 81 (03 Jun 2020 15:30) (81 - 103)  BP: 188/75 (03 Jun 2020 15:30) (168/68 - 207/77)  RR: 22 (03 Jun 2020 15:30) (22 - 32)  SpO2: 100% (03 Jun 2020 15:30) (98% - 100%)    P/E:  elderly female, in no acute distress, mild respiratory distress (fast breathing)  Neuro: Alert, Awake, bruising over the face, followed simple commands  CVS: S1S2 present  Resp: BLAE+, No wheeze or Rhonchi  GI: Soft, BS+, NT, ND  Extr; B/L LE edema with redness on both legs above ankles and slight warmth over right    Labs:                        11.5   17.59 )-----------( 287      ( 03 Jun 2020 11:12 )             37.2   06-03    141  |  109<H>  |  46<H>  ----------------------------<  115<H>  3.4<L>   |  21<L>  |  1.49<H>    Ca    9.8      03 Jun 2020 11:12    TPro  8.6<H>  /  Alb  4.2  /  TBili  1.3<H>  /  DBili  x   /  AST  44<H>  /  ALT  27  /  AlkPhos  127<H>  06-03    D/D:  Acute Hypoxemic Respiratory failure likely due to vascular congestion concerning for heart failure precipitated by  Uncontrolled HTN/ HTN urgency  Leucocytosis suspected Community acquired Pneumonia versus possible LE cellulitis (Right > Left)  Recurrent falls  Suspected COVID-19 although less likely     Plan:  Telemetry; 2D echo; Serial cardiac enzymes  Supplemental Oxygen to keep saturation more than 92%  Place on 4 liter NC for now as patient improved; d/w RN if worsening SOB or saturation drop below 90% can consider NRB mask  IV lasix 40 mg twice daily x 24 hrs switch to once daily tomorrow  Lopressor 2.5 mg IV q 6 hrs routine; Hold if SBP less than 130 mm hg  IV Ceftriaxone and Zithromax empirically to cover for CAP  Ecotrin/ Statin  Get CT Head  DVT ppx with heparin  Cardio Dr. Vega  [facility]: PGY2 d/w daughter: DNR status  If no significant improvement clinically in the next 48 hrs will consider Palliative eval    Discussed with PGY2 DANIEL Asher and ED Hold RN Diamond SANCHEZ d/w family and updated findings and plan of care Patient seen and examined; Agree with PGY2 A/P above with editing as needed. My independent assessment, findings on exam, diagnosis and plan of care as listed below. Discussed with Dr. TARYN Asher    89 y/o female from home with PMH of HTN presents to the ED after a fall at home. Patient with multiple falls recently. Patient was found to be hypoxic with significantly low oxygenation in ED and placed on 100% NRB mask.  As per daughter, patient has had three mechanical falls since Monday, with most recent fall being this morning. She denies noting any other issues or symptoms out of the ordinary.     Patient received intravenous lasix twice in ED with significant improvement and improvement in respiratory distress.  I placed her on 4 liter NC saturating 90% without using accessory muscle.    ROS: as above  FH: Not pertinent to current presentation  SH: Lives with Daughter    Vital Signs Last 24 Hrs  T(C): 36.5 (03 Jun 2020 15:30), Max: 36.6 (03 Jun 2020 10:52)  T(F): 97.7 (03 Jun 2020 15:30), Max: 97.8 (03 Jun 2020 10:52)  HR: 81 (03 Jun 2020 15:30) (81 - 103)  BP: 188/75 (03 Jun 2020 15:30) (168/68 - 207/77)  RR: 22 (03 Jun 2020 15:30) (22 - 32)  SpO2: 100% (03 Jun 2020 15:30) (98% - 100%)    P/E:  elderly female, in no acute distress, mild respiratory distress (fast breathing)  Neuro: Alert, Awake, bruising over the face, followed simple commands  CVS: S1S2 present  Resp: BLAE+, No wheeze or Rhonchi  GI: Soft, BS+, NT, ND  Extr; B/L LE edema with redness on both legs above ankles and slight warmth over right    Labs:                        11.5   17.59 )-----------( 287      ( 03 Jun 2020 11:12 )             37.2   06-03    141  |  109<H>  |  46<H>  ----------------------------<  115<H>  3.4<L>   |  21<L>  |  1.49<H>    Ca    9.8      03 Jun 2020 11:12    TPro  8.6<H>  /  Alb  4.2  /  TBili  1.3<H>  /  DBili  x   /  AST  44<H>  /  ALT  27  /  AlkPhos  127<H>  06-03    D/D:  Acute Hypoxemic Respiratory failure likely due to vascular congestion concerning for heart failure (edema, CXR opacities, elevated BNP) precipitated by Uncontrolled HTN/ HTN urgency  Leucocytosis suspected Community acquired Pneumonia versus possible LE cellulitis (Right > Left)  Elevated troponin likely Demand ischemia due to HTN urfency  Recurrent falls  Suspected COVID-19 although less likely     Plan:  Telemetry; 2D echo; Serial cardiac enzymes  Supplemental Oxygen to keep saturation more than 92%  Place on 4 liter NC for now as patient improved; d/w RN if worsening SOB or saturation drop below 90% can consider NRB mask  IV lasix 40 mg twice daily x 24 hrs switch to once daily tomorrow  Lopressor 2.5 mg IV q 6 hrs routine; Hold if SBP less than 130 mm hg  IV Ceftriaxone and Zithromax empirically to cover for CAP  Ecotrin/ Statin  Get CT Head  DVT ppx with heparin  Cardio Dr. Vega  [facility]: PGY2 d/w daughter: DNR status  If no significant improvement clinically in the next 48 hrs will consider Palliative eval    Discussed with PGY2 DANIEL Asher and ED Hold RN Diamond SANCHEZ d/w family and updated findings and plan of care

## 2020-06-03 NOTE — ED ADULT NURSE NOTE - OBJECTIVE STATEMENT
pt fell out of bed.  Lac noted to L forehead.  c/o shortness of breath, tachypneic RR 30's.  O2 mid-high 90's on nonbreather at 15 ltr n/c.  Audible wheezes noted on exam.  pt answers orientation questions appropriately, however, confused at time.s

## 2020-06-03 NOTE — H&P ADULT - HISTORY OF PRESENT ILLNESS
89 y/o female from home with PMHx of HTN presents to the ED after a fall at home. History from patient is limited as patient is in moderate respiratory distress requiring nonrebreather mask for support. History aided by patient's daughter Tammi. As per daughter, patient has had three mechanical falls since Monday, with most recent fall being this morning. She denies noting any other issues or symptoms out of the ordinary. Per EMS, patient's O2 sat was in the 70s when she was found, and she was placed on NRB mask. At present, patient is in moderate respiratory distress with tachypnea. She is maintaining an O2 saturation of 97% on 100% NRB.    GOC: Discussed with daughter Tammi, who says she would not want her mother on a ventilator or to undergo CPR. Patient is DNR/DNI. MOLST form drafted and placed in chart.

## 2020-06-04 NOTE — PHYSICAL THERAPY INITIAL EVALUATION ADULT - GENERAL OBSERVATIONS, REHAB EVAL
PAtient received in semi-supine; AxOx2, on NB, w/ IV line and monitor. Patient presents w/ redness and pain on both lower legs.

## 2020-06-04 NOTE — PROGRESS NOTE ADULT - PROBLEM SELECTOR PLAN 2
EKG on admission shows afib with RVR  s/p cardizem 10mg IV push x1 in ED  cardiac enzyme elevation noted, trending up likely demand ischemia  on lopressor IV push  Patient was taking verapamil at home, probably had afib  will continue with lopressor in setting of CHF  c/w tele

## 2020-06-04 NOTE — PROGRESS NOTE ADULT - ATTENDING COMMENTS
Patient is a 89y old  Female who presents with a chief complaint of Recurrent falls noted to have low oxygen saturation by EMS (2020 11:16)    Patient was seen and examined at bedside   AAOx1, comfortable on NRB    REVIEW OF SYSTEMS: not obtained     PHYSICAL EXAM:  GENERAL: NAD  NERVOUS SYSTEM:  Alert & Oriented X1, co-operative and not able to follow commands   CHEST/LUNG: BL coarse breath sound with crepitations   HEART: Regular rate and rhythm; No murmurs, rubs, or gallops  ABDOMEN: Soft, Nontender, Nondistended; Bowel sounds present  EXTREMITIES:  2+ Peripheral Pulses, No clubbing, cyanosis, or edema  SKIN: No rashes or lesions    LABS:                        11.5   21.00 )-----------( 252      ( 2020 07:30 )             36.6     141  |  103  |  47<H>  ----------------------------<  89  3.0<L>   |  25  |  1.68<H>    Ca    9.4      2020 07:30  Phos  4.3     06-04  Mg     2.1     06-04    TPro  8.0  /  Alb  3.9  /  TBili  1.2  /  DBili  x   /  AST  86<H>  /  ALT  36  /  AlkPhos  106  06-04    PT/INR - ( 2020 11:12 )   PT: 10.9 sec;   INR: 0.97 ratio         PTT - ( 2020 11:12 )  PTT:29.5 sec  Urinalysis Basic - ( 2020 20:05 )    Color: Yellow / Appearance: Clear / S.010 / pH: x  Gluc: x / Ketone: Negative  / Bili: Negative / Urobili: Negative   Blood: x / Protein: 15 / Nitrite: Negative   Leuk Esterase: Negative / RBC: 25-50 /HPF / WBC 0-2 /HPF   Sq Epi: x / Non Sq Epi: Few /HPF / Bacteria: Trace /HPF      CAPILLARY BLOOD GLUCOSE      POCT Blood Glucose.: 108 mg/dL (2020 17:39)  ABG - ( 2020 12:47 )  pH, Arterial: 7.37  pH, Blood: x     /  pCO2: 37    /  pO2: 91    / HCO3: 21    / Base Excess: -3.3  /  SaO2: 96        Assessment and plan:   1. Acute hypoxic respiratory failure due to volume overload from possible CHF   2. TAWANNA  3. Atrial fibrillation with RVR   4. Fall   5. HTN     Plan:   CXR improved after Lasix, will cont 40mg qd   Hold IVF for now  Trend CK and LA  Monitor renal function, hold losartan, Metoprolol 12.5mg qd  for BP and HR   Cont Abx to cover CAP, follow cultures   UA neg   PT eval   Swallow eval   Palliative care consult   DNR/ DNI Patient is a 89y old  Female who presents with a chief complaint of Recurrent falls noted to have low oxygen saturation by EMS (2020 11:16)    Patient was seen and examined at bedside   AAOx1, comfortable on NRB    REVIEW OF SYSTEMS: not obtained     PHYSICAL EXAM:  GENERAL: NAD  NERVOUS SYSTEM:  Alert & Oriented X1, co-operative and not able to follow commands   CHEST/LUNG: BL coarse breath sound with crepitations   HEART: Regular rate and rhythm; No murmurs, rubs, or gallops  ABDOMEN: Soft, Nontender, Nondistended; Bowel sounds present  EXTREMITIES:  2+ Peripheral Pulses, No clubbing, cyanosis, or edema  SKIN: No rashes or lesions    LABS:                        11.5   21.00 )-----------( 252      ( 2020 07:30 )             36.6     141  |  103  |  47<H>  ----------------------------<  89  3.0<L>   |  25  |  1.68<H>    Ca    9.4      2020 07:30  Phos  4.3     06-04  Mg     2.1     06-04    TPro  8.0  /  Alb  3.9  /  TBili  1.2  /  DBili  x   /  AST  86<H>  /  ALT  36  /  AlkPhos  106  06-04    PT/INR - ( 2020 11:12 )   PT: 10.9 sec;   INR: 0.97 ratio         PTT - ( 2020 11:12 )  PTT:29.5 sec  Urinalysis Basic - ( 2020 20:05 )    Color: Yellow / Appearance: Clear / S.010 / pH: x  Gluc: x / Ketone: Negative  / Bili: Negative / Urobili: Negative   Blood: x / Protein: 15 / Nitrite: Negative   Leuk Esterase: Negative / RBC: 25-50 /HPF / WBC 0-2 /HPF   Sq Epi: x / Non Sq Epi: Few /HPF / Bacteria: Trace /HPF      CAPILLARY BLOOD GLUCOSE      POCT Blood Glucose.: 108 mg/dL (2020 17:39)  ABG - ( 2020 12:47 )  pH, Arterial: 7.37  pH, Blood: x     /  pCO2: 37    /  pO2: 91    / HCO3: 21    / Base Excess: -3.3  /  SaO2: 96        Assessment and plan:   1. Acute hypoxic respiratory failure due to volume overload from possible CHF   2. TAWANNA  3. Atrial fibrillation with RVR   4. Recurrent Falls  5. HTN     Plan:   CXR improved after Lasix, will cont 40mg qd   Hold IVF for now  Trend CK and LA  Monitor renal function, hold losartan, Metoprolol 2.5mg q6hrs IVP for BP and HR   Cont Abx to cover CAP, follow cultures   UA neg   PT eval [facility]OOK  Swallow eval   Palliative care consult   DNR/ DNI  Daughter reports patient has been falling a lot recently, her knees are weak and painful, she needed help to walk to bathroom. Before she was able to walk by herself but gait was wobbly. She agrees to place patient in [facility]

## 2020-06-04 NOTE — DIETITIAN INITIAL EVALUATION ADULT. - PERTINENT MEDS FT
MEDICATIONS  (STANDING):  azithromycin  IVPB 500 milliGRAM(s) IV Intermittent every 24 hours  cefTRIAXone   IVPB 1000 milliGRAM(s) IV Intermittent every 24 hours  heparin   Injectable 5000 Unit(s) SubCutaneous every 8 hours  metoprolol tartrate Injectable 2.5 milliGRAM(s) IV Push every 6 hours  potassium chloride  10 mEq/100 mL IVPB 10 milliEquivalent(s) IV Intermittent every 1 hour

## 2020-06-04 NOTE — PHYSICAL THERAPY INITIAL EVALUATION ADULT - RANGE OF MOTION EXAMINATION, REHAB EVAL
Left LE ROM was WFL (within functional limits)/Right UE ROM was WFL (within functional limits)/Left UE ROM was WFL (within functional limits)/Right LE ROM was WFL (within functional limits)

## 2020-06-04 NOTE — DIETITIAN INITIAL EVALUATION ADULT. - FACTORS AFF FOOD INTAKE
difficulty chewing/difficulty with food procurement/preparation/Yarsanism/ethnic/cultural/personal food preferences/acute on chronic comorbidities

## 2020-06-04 NOTE — PHYSICAL THERAPY INITIAL EVALUATION ADULT - PATIENT/FAMILY AGREES WITH PLAN
yes/Recommending SUB-ACUTE REHAB for intensive strengthening, gait, balance and transfer training for safer transition to home.

## 2020-06-04 NOTE — CONSULT NOTE ADULT - SUBJECTIVE AND OBJECTIVE BOX
CHIEF COMPLAINT: Fall    HPI: 89 yo F with HTN who was brought in by EMS after a fall at home, noted to be in respiratory distress and atrial fibrillation. Patient     PAST MEDICAL & SURGICAL HISTORY:  As above  No significant past surgical history      Allergies    No Known Allergies    MEDICATIONS  (STANDING):  azithromycin  IVPB 500 milliGRAM(s) IV Intermittent every 24 hours  cefTRIAXone   IVPB 1000 milliGRAM(s) IV Intermittent every 24 hours  heparin   Injectable 5000 Unit(s) SubCutaneous every 8 hours  metoprolol tartrate Injectable 2.5 milliGRAM(s) IV Push every 6 hours  potassium chloride  10 mEq/100 mL IVPB 10 milliEquivalent(s) IV Intermittent every 1 hour    MEDICATIONS  (PRN):  acetaminophen  Suppository .. 650 milliGRAM(s) Rectal every 6 hours PRN Mild Pain (1 - 3), Moderate Pain (4 - 6), Severe Pain (7 - 10)  haloperidol    Injectable 0.5 milliGRAM(s) IV Push every 8 hours PRN Extreme agitation      FAMILY HISTORY:  No pertinent family history in first degree relatives    No family history of premature coronary artery disease or sudden cardiac death    SOCIAL HISTORY:  Smoking-  Alcohol-  Illicit Drug use-    REVIEW OF SYSTEMS:  Constitutional: [ ] fever, [ ]weight loss,  [ ]fatigue  Eyes: [ ] visual changes  Respiratory: [ ]shortness of breath;  [ ] cough, [ ]wheezing, [ ]chills, [ ]hemoptysis  Cardiovascular: [ ] chest pain, [ ]palpitations, [ ]dizziness,  [ ]leg swelling [ ]syncope  Gastrointestinal: [ ] abdominal pain, [ ]nausea, [ ]vomiting,  [ ]diarrhea   Genitourinary: [ ] dysuria, [ ] hematuria  Neurologic: [ ] headaches [ ] tremors  [ ] weakness [ ] lightheadedness  Skin: [ ] itching, [ ]burning, [ ] rashes  Endocrine: [ ] heat or cold intolerance  Musculoskeletal: [ ] joint pain or swelling; [ ] muscle, back, or extremity pain  Psychiatric: [ ] depression, [ ]anxiety, [ ]mood swings, or [ ]difficulty sleeping  Hematologic: [ ] easy bruising, [ ] bleeding gums       [ x] All others negative	  [ ] Unable to obtain    Vital Signs Last 24 Hrs  T(C): 37.1 (04 Jun 2020 05:32), Max: 37.1 (04 Jun 2020 05:32)  T(F): 98.8 (04 Jun 2020 05:32), Max: 98.8 (04 Jun 2020 05:32)  HR: 81 (04 Jun 2020 05:32) (76 - 105)  BP: 167/62 (04 Jun 2020 05:32) (153/96 - 207/77)  BP(mean): --  RR: 20 (04 Jun 2020 05:32) (18 - 32)  SpO2: 100% (04 Jun 2020 05:32) (94% - 100%)  I&O's Summary    03 Jun 2020 07:01  -  04 Jun 2020 07:00  --------------------------------------------------------  IN: 0 mL / OUT: 600 mL / NET: -600 mL        PHYSICAL EXAM:  General: No acute distress  HEENT: EOMI, PERRL  Neck: Supple, No JVD  Lungs: Clear to auscultation bilaterally; No rales or wheezing  Heart: Regular rate and rhythm; No murmurs, rubs, or gallops  Abdomen: Nontender, bowel sounds present  Extremities: No clubbing, cyanosis, or edema  Nervous system:  Alert & Oriented X3, no focal deficits  Psychiatric: Normal affect  Skin: No rashes or lesions      LABS:  06-04    141  |  103  |  47<H>  ----------------------------<  89  3.0<L>   |  25  |  1.68<H>    Ca    9.4      04 Jun 2020 07:30  Phos  4.3     06-04  Mg     2.1     06-04    TPro  8.0  /  Alb  3.9  /  TBili  1.2  /  DBili  x   /  AST  86<H>  /  ALT  36  /  AlkPhos  106  06-04    Creatinine Trend: 1.68<--, 1.49<--                        11.5   21.00 )-----------( 252      ( 04 Jun 2020 07:30 )             36.6     PT/INR - ( 03 Jun 2020 11:12 )   PT: 10.9 sec;   INR: 0.97 ratio         PTT - ( 03 Jun 2020 11:12 )  PTT:29.5 sec    Lipid Panel: Cholesterol, Serum 201  Direct LDL 97  HDL Cholesterol, Serum 93  Triglycerides, Serum 55    Cardiac Enzymes: CARDIAC MARKERS ( 04 Jun 2020 07:30 )  0.398 ng/mL / x     / 1180 U/L / x     / 21.4 ng/mL  CARDIAC MARKERS ( 03 Jun 2020 19:15 )  0.254 ng/mL / x     / 1050 U/L / x     / 17.9 ng/mL  CARDIAC MARKERS ( 03 Jun 2020 11:12 )  0.149 ng/mL / x     / 561 U/L / x     / x        Serum Pro-Brain Natriuretic Peptide: 5863 pg/mL (06-03-20 @ 11:12)    RADIOLOGY: < from: Xray Chest 1 View-PORTABLE IMMEDIATE (06.03.20 @ 11:04) >  IMPRESSION: Bilateral lung parenchymal airspace opacities. Small pleural effusions cannot be excluded.    < end of copied text >      ECG [my interpretation]: 6/3/2020 @ 10:46: A fib @ 106bpm, voltage criteria for LVH, nonspecific T-wave abnormality    TELEMETRY: CHIEF COMPLAINT: Fall    HPI: 89 yo F with HTN who was brought in by EMS after a fall at home, noted to be in respiratory distress and atrial fibrillation. Patient is A&O x1-2, unable to provide any additional information, which was obtained from chart review as well as discussion with care providers. Patient reportedly with "mechanical falls" at home, with no reported syncope.     PAST MEDICAL & SURGICAL HISTORY:  As above  No significant past surgical history      Allergies    No Known Allergies    MEDICATIONS  (STANDING):  azithromycin  IVPB 500 milliGRAM(s) IV Intermittent every 24 hours  cefTRIAXone   IVPB 1000 milliGRAM(s) IV Intermittent every 24 hours  heparin   Injectable 5000 Unit(s) SubCutaneous every 8 hours  metoprolol tartrate Injectable 2.5 milliGRAM(s) IV Push every 6 hours  potassium chloride  10 mEq/100 mL IVPB 10 milliEquivalent(s) IV Intermittent every 1 hour    MEDICATIONS  (PRN):  acetaminophen  Suppository .. 650 milliGRAM(s) Rectal every 6 hours PRN Mild Pain (1 - 3), Moderate Pain (4 - 6), Severe Pain (7 - 10)  haloperidol    Injectable 0.5 milliGRAM(s) IV Push every 8 hours PRN Extreme agitation      FAMILY HISTORY:  No pertinent family history in first degree relatives    No family history of premature coronary artery disease or sudden cardiac death    SOCIAL HISTORY:  Smoking-  Alcohol-  Illicit Drug use-    REVIEW OF SYSTEMS:  Constitutional: [ ] fever, [ ]weight loss,  [ ]fatigue  Eyes: [ ] visual changes  Respiratory: [ ]shortness of breath;  [ ] cough, [ ]wheezing, [ ]chills, [ ]hemoptysis  Cardiovascular: [ ] chest pain, [ ]palpitations, [ ]dizziness,  [ ]leg swelling [ ]syncope  Gastrointestinal: [ ] abdominal pain, [ ]nausea, [ ]vomiting,  [ ]diarrhea   Genitourinary: [ ] dysuria, [ ] hematuria  Neurologic: [ ] headaches [ ] tremors  [ ] weakness [ ] lightheadedness  Skin: [ ] itching, [ ]burning, [ ] rashes  Endocrine: [ ] heat or cold intolerance  Musculoskeletal: [ ] joint pain or swelling; [ ] muscle, back, or extremity pain  Psychiatric: [ ] depression, [ ]anxiety, [ ]mood swings, or [ ]difficulty sleeping  Hematologic: [ ] easy bruising, [ ] bleeding gums       [ x] All others negative	  [ ] Unable to obtain    Vital Signs Last 24 Hrs  T(C): 37.1 (04 Jun 2020 05:32), Max: 37.1 (04 Jun 2020 05:32)  T(F): 98.8 (04 Jun 2020 05:32), Max: 98.8 (04 Jun 2020 05:32)  HR: 81 (04 Jun 2020 05:32) (76 - 105)  BP: 167/62 (04 Jun 2020 05:32) (153/96 - 207/77)  BP(mean): --  RR: 20 (04 Jun 2020 05:32) (18 - 32)  SpO2: 100% (04 Jun 2020 05:32) (94% - 100%)  I&O's Summary    03 Jun 2020 07:01  -  04 Jun 2020 07:00  --------------------------------------------------------  IN: 0 mL / OUT: 600 mL / NET: -600 mL        PHYSICAL EXAM:  General: No acute distress  HEENT: EOMI, PERRL  Neck: Supple, No JVD  Lungs: Clear to auscultation bilaterally; No rales or wheezing  Heart: Regular rate and rhythm; No murmurs, rubs, or gallops  Abdomen: Nontender, bowel sounds present  Extremities: No clubbing, cyanosis, or edema  Nervous system:  Alert & Oriented X3, no focal deficits  Psychiatric: Normal affect  Skin: No rashes or lesions      LABS:  06-04    141  |  103  |  47<H>  ----------------------------<  89  3.0<L>   |  25  |  1.68<H>    Ca    9.4      04 Jun 2020 07:30  Phos  4.3     06-04  Mg     2.1     06-04    TPro  8.0  /  Alb  3.9  /  TBili  1.2  /  DBili  x   /  AST  86<H>  /  ALT  36  /  AlkPhos  106  06-04    Creatinine Trend: 1.68<--, 1.49<--                        11.5   21.00 )-----------( 252      ( 04 Jun 2020 07:30 )             36.6     PT/INR - ( 03 Jun 2020 11:12 )   PT: 10.9 sec;   INR: 0.97 ratio         PTT - ( 03 Jun 2020 11:12 )  PTT:29.5 sec    Lipid Panel: Cholesterol, Serum 201  Direct LDL 97  HDL Cholesterol, Serum 93  Triglycerides, Serum 55    Cardiac Enzymes: CARDIAC MARKERS ( 04 Jun 2020 07:30 )  0.398 ng/mL / x     / 1180 U/L / x     / 21.4 ng/mL  CARDIAC MARKERS ( 03 Jun 2020 19:15 )  0.254 ng/mL / x     / 1050 U/L / x     / 17.9 ng/mL  CARDIAC MARKERS ( 03 Jun 2020 11:12 )  0.149 ng/mL / x     / 561 U/L / x     / x        Serum Pro-Brain Natriuretic Peptide: 5863 pg/mL (06-03-20 @ 11:12)    RADIOLOGY: < from: Xray Chest 1 View-PORTABLE IMMEDIATE (06.03.20 @ 11:04) >  IMPRESSION: Bilateral lung parenchymal airspace opacities. Small pleural effusions cannot be excluded.    < end of copied text >      ECG [my interpretation]: 6/3/2020 @ 10:46: A fib @ 106bpm, voltage criteria for LVH, nonspecific T-wave abnormality    TELEMETRY: CHIEF COMPLAINT: Fall    HPI: 89 yo F with HTN who was brought in by EMS after a fall at home, noted to be in respiratory distress and atrial fibrillation. Patient is A&O x1-2, unable to provide any additional information, which was obtained from chart review as well as discussion with care providers. Patient reportedly with "mechanical falls" at home, with no reported syncope.     PAST MEDICAL & SURGICAL HISTORY:  As above  No significant past surgical history      Allergies    No Known Allergies    MEDICATIONS  (STANDING):  azithromycin  IVPB 500 milliGRAM(s) IV Intermittent every 24 hours  cefTRIAXone   IVPB 1000 milliGRAM(s) IV Intermittent every 24 hours  heparin   Injectable 5000 Unit(s) SubCutaneous every 8 hours  metoprolol tartrate Injectable 2.5 milliGRAM(s) IV Push every 6 hours  potassium chloride  10 mEq/100 mL IVPB 10 milliEquivalent(s) IV Intermittent every 1 hour    MEDICATIONS  (PRN):  acetaminophen  Suppository .. 650 milliGRAM(s) Rectal every 6 hours PRN Mild Pain (1 - 3), Moderate Pain (4 - 6), Severe Pain (7 - 10)  haloperidol    Injectable 0.5 milliGRAM(s) IV Push every 8 hours PRN Extreme agitation      FAMILY HISTORY:  Unable to assess    SOCIAL HISTORY:  Unable to assess    REVIEW OF SYSTEMS:  Constitutional: [ ] fever, [ ]weight loss,  [ ]fatigue  Eyes: [ ] visual changes  Respiratory: [ ]shortness of breath;  [ ] cough, [ ]wheezing, [ ]chills, [ ]hemoptysis  Cardiovascular: [ ] chest pain, [ ]palpitations, [ ]dizziness,  [ ]leg swelling [ ]syncope  Gastrointestinal: [ ] abdominal pain, [ ]nausea, [ ]vomiting,  [ ]diarrhea   Genitourinary: [ ] dysuria, [ ] hematuria  Neurologic: [ ] headaches [ ] tremors  [ ] weakness [ ] lightheadedness  Skin: [ ] itching, [ ]burning, [ ] rashes  Endocrine: [ ] heat or cold intolerance  Musculoskeletal: [ ] joint pain or swelling; [ ] muscle, back, or extremity pain  Psychiatric: [ ] depression, [ ]anxiety, [ ]mood swings, or [ ]difficulty sleeping  Hematologic: [ ] easy bruising, [ ] bleeding gums       [ ] All others negative	  [x ] Unable to obtain    Vital Signs Last 24 Hrs  T(C): 37.1 (04 Jun 2020 05:32), Max: 37.1 (04 Jun 2020 05:32)  T(F): 98.8 (04 Jun 2020 05:32), Max: 98.8 (04 Jun 2020 05:32)  HR: 81 (04 Jun 2020 05:32) (76 - 105)  BP: 167/62 (04 Jun 2020 05:32) (153/96 - 207/77)  BP(mean): --  RR: 20 (04 Jun 2020 05:32) (18 - 32)  SpO2: 100% (04 Jun 2020 05:32) (94% - 100%)  I&O's Summary    03 Jun 2020 07:01  -  04 Jun 2020 07:00  --------------------------------------------------------  IN: 0 mL / OUT: 600 mL / NET: -600 mL        PHYSICAL EXAM:  General: No acute distress  HEENT: EOMI, PERRL  Neck: Supple, No JVD  Lungs: Clear to auscultation bilaterally; No rales or wheezing  Heart: Regular rate and rhythm; No murmurs, rubs, or gallops  Abdomen: Nontender, bowel sounds present  Extremities: No clubbing, cyanosis, or edema  Nervous system:  Alert & Oriented X3, no focal deficits  Psychiatric: Normal affect  Skin: No rashes or lesions      LABS:  06-04    141  |  103  |  47<H>  ----------------------------<  89  3.0<L>   |  25  |  1.68<H>    Ca    9.4      04 Jun 2020 07:30  Phos  4.3     06-04  Mg     2.1     06-04    TPro  8.0  /  Alb  3.9  /  TBili  1.2  /  DBili  x   /  AST  86<H>  /  ALT  36  /  AlkPhos  106  06-04    Creatinine Trend: 1.68<--, 1.49<--                        11.5   21.00 )-----------( 252      ( 04 Jun 2020 07:30 )             36.6     PT/INR - ( 03 Jun 2020 11:12 )   PT: 10.9 sec;   INR: 0.97 ratio         PTT - ( 03 Jun 2020 11:12 )  PTT:29.5 sec    Lipid Panel: Cholesterol, Serum 201  Direct LDL 97  HDL Cholesterol, Serum 93  Triglycerides, Serum 55    Cardiac Enzymes: CARDIAC MARKERS ( 04 Jun 2020 07:30 )  0.398 ng/mL / x     / 1180 U/L / x     / 21.4 ng/mL  CARDIAC MARKERS ( 03 Jun 2020 19:15 )  0.254 ng/mL / x     / 1050 U/L / x     / 17.9 ng/mL  CARDIAC MARKERS ( 03 Jun 2020 11:12 )  0.149 ng/mL / x     / 561 U/L / x     / x        Serum Pro-Brain Natriuretic Peptide: 5863 pg/mL (06-03-20 @ 11:12)    RADIOLOGY: < from: Xray Chest 1 View-PORTABLE IMMEDIATE (06.03.20 @ 11:04) >  IMPRESSION: Bilateral lung parenchymal airspace opacities. Small pleural effusions cannot be excluded.    < end of copied text >      ECG [my interpretation]: 6/3/2020 @ 10:46: A fib @ 106bpm, voltage criteria for LVH, nonspecific T-wave abnormality    TELEMETRY: A fib --> currently in sinus rhythm in 80s

## 2020-06-04 NOTE — DIETITIAN INITIAL EVALUATION ADULT. - PERTINENT LABORATORY DATA
06-04 Na141 mmol/L Glu 89 mg/dL K+ 3.0 mmol/L<L> Cr  1.68 mg/dL<H> BUN 47 mg/dL<H>   06-04 Phos 4.3 mg/dL   06-04 Alb 3.9 g/dL       06-04 Chol 201 mg/dL<H> LDL 97 mg/dL HDL 93 mg/dL Trig 55 mg/dL

## 2020-06-04 NOTE — PHYSICAL THERAPY INITIAL EVALUATION ADULT - PATIENT/FAMILY/SIGNIFICANT OTHER GOALS STATEMENT, PT EVAL
Spoke to dtr. who states wants patient for Recommending [facility] for intensive strengthening, gait, balance and transfer training for safer transition to home.

## 2020-06-04 NOTE — PHYSICAL THERAPY INITIAL EVALUATION ADULT - ADDITIONAL COMMENTS
Spoke with patient's dtr. Dtr. states unable to care for patient at home at this time. Dtr. in agreement w/ DC recommendation for ([facility])

## 2020-06-04 NOTE — PROGRESS NOTE ADULT - PROBLEM SELECTOR PLAN 1
patient was weened off to nasal cannula 2L  covid negx1  pr BNp was elevated, troponin trending up  Patient is not in acute distress  She was found to have b/l infiltrates on cxr  started on rocephin and zithro  could be decompensated HF vs pneumonia   Was initially started on lasix, holding off for now in view of elevated creatinine and physical exam  f/u echo

## 2020-06-04 NOTE — PHYSICAL THERAPY INITIAL EVALUATION ADULT - DIAGNOSIS, PT EVAL
Patient presents w/ weakness, decreased balance and limited gait. Patient is at high risk for falls.

## 2020-06-04 NOTE — DIETITIAN INITIAL EVALUATION ADULT. - OTHER INFO
Pt from home, lives with family, high suspicious for COVID19, in airborne/contact isolation room, unable to conduct a face to face interview due to limited contact restrictions related to pt's medical condition and isolation precautions, pt agitated at times reported; spoke to daughter ( Tammi Boateng ) at [phone]; good appetite at home, some wt gain recently, usual ka=593 to 110 lb, lost a few teeth recently, tolerating soft diet, denied GI distress PTA, some food preference/tolerance obtained for when diet advanced as appropriate; currently NPO x 1 to 2d, pending swallow evaluation; Discussed with RN, SLP

## 2020-06-04 NOTE — PROGRESS NOTE ADULT - SUBJECTIVE AND OBJECTIVE BOX
PGY 1 Note discussed with supervising resident and primary attending    Patient is a 89y old  Female who presents with a chief complaint of Recurrent falls noted to have low oxygen saturation by EMS (2020 09:26)      INTERVAL HPI/OVERNIGHT EVENTS:   Patient was found to have b/l diffuse infiltrates on CXr. She looks dehydrated. lasix was stopped in setting of clinical dehydration adn creatinine trending up.  Will repeat CXr. CK is trending up along with troponin.   echo is pending    MEDICATIONS  (STANDING):  azithromycin  IVPB 500 milliGRAM(s) IV Intermittent every 24 hours  cefTRIAXone   IVPB 1000 milliGRAM(s) IV Intermittent every 24 hours  heparin   Injectable 5000 Unit(s) SubCutaneous every 8 hours  metoprolol tartrate Injectable 2.5 milliGRAM(s) IV Push every 6 hours  potassium chloride  10 mEq/100 mL IVPB 10 milliEquivalent(s) IV Intermittent every 1 hour    MEDICATIONS  (PRN):  acetaminophen  Suppository .. 650 milliGRAM(s) Rectal every 6 hours PRN Mild Pain (1 - 3), Moderate Pain (4 - 6), Severe Pain (7 - 10)  haloperidol    Injectable 0.5 milliGRAM(s) IV Push every 8 hours PRN Extreme agitation      __________________________________________________      Vital Signs Last 24 Hrs  T(C): 36.6 (2020 09:15), Max: 37.1 (2020 05:32)  T(F): 97.9 (2020 09:15), Max: 98.8 (2020 05:32)  HR: 77 (2020 09:32) (76 - 105)  BP: 180/90 (2020 09:15) (153/96 - 207/77)  BP(mean): --  RR: 22 (2020 09:15) (18 - 32)  SpO2: 96% (2020 09:32) (63% - 100%)    ________________________________________________  PHYSICAL EXAM:  GENERAL:lethargic, abrasions on forehead and nose  CHEST/LUNG: b/l crackles   HEART: S1 S2  irregular  ABDOMEN: Soft, Nontender, Nondistended; Bowel sounds present  EXTREMITIES: no cyanosis; no edema; no calf tenderness  SKIN: looks dehydrated  NERVOUS SYSTEM:  waking up to verbal commands    _________________________________________________  LABS:                        11.5   21.00 )-----------( 252      ( 2020 07:30 )             36.6     06-04    141  |  103  |  47<H>  ----------------------------<  89  3.0<L>   |  25  |  1.68<H>    Ca    9.4      2020 07:30  Phos  4.3     06-04  Mg     2.1     06-04    TPro  8.0  /  Alb  3.9  /  TBili  1.2  /  DBili  x   /  AST  86<H>  /  ALT  36  /  AlkPhos  106  06-04    PT/INR - ( 2020 11:12 )   PT: 10.9 sec;   INR: 0.97 ratio         PTT - ( 2020 11:12 )  PTT:29.5 sec  Urinalysis Basic - ( 2020 20:05 )    Color: Yellow / Appearance: Clear / S.010 / pH: x  Gluc: x / Ketone: Negative  / Bili: Negative / Urobili: Negative   Blood: x / Protein: 15 / Nitrite: Negative   Leuk Esterase: Negative / RBC: 25-50 /HPF / WBC 0-2 /HPF   Sq Epi: x / Non Sq Epi: Few /HPF / Bacteria: Trace /HPF      CAPILLARY BLOOD GLUCOSE      POCT Blood Glucose.: 104 mg/dL (2020 06:08)  POCT Blood Glucose.: 116 mg/dL (2020 01:19)        RADIOLOGY & ADDITIONAL TESTS:    Imaging Personally Reviewed:  YES/NO    Consultant(s) Notes Reviewed:   YES/ No    Care Discussed with Consultants :     Plan of care was discussed with patient and /or primary care giver; all questions and concerns were addressed and care was aligned with patient's wishes.

## 2020-06-04 NOTE — SWALLOW BEDSIDE ASSESSMENT ADULT - SLP PERTINENT HISTORY OF CURRENT PROBLEM
89 yo F with HTN who was brought in by EMS after a fall at home, noted to be in respiratory distress and atrial fibrillation. Patient is A&O x1-2, unable to provide any additional information, which was obtained from chart review as well as discussion with care providers. Patient reportedly with "mechanical falls" at home, with no reported syncope.

## 2020-06-04 NOTE — PHYSICAL THERAPY INITIAL EVALUATION ADULT - DISCHARGE DISPOSITION, PT EVAL
rehabilitation facility/Recommending SUB-ACUTE REHAB for intensive strengthening, gait, balance and transfer training for safer transition to home.

## 2020-06-04 NOTE — SWALLOW BEDSIDE ASSESSMENT ADULT - SWALLOW EVAL: DIAGNOSIS
Pt p/w s&s oropharyngeal dysphagia, c/b weak labial seal/pucker, impaired bolus formation, slow A-P transport, premature spillage of bolus to the pharynx; delayed swallow reflex, reduced hyoid excursion, poor laryngeal elevation, and overt s&s of penetration/aspiration on thin liquids seen at this exam.

## 2020-06-04 NOTE — DIETITIAN INITIAL EVALUATION ADULT. - NS FNS WEIGHT USED FOR CALC
Ht=5' 2"   DWH=065 lb    Admission wt=89 lb ??   BMI=16.5 ??    Current xh=030.6 lb 6/4/2020   Revised BMI=18.9/current Ht=5' 2"   FRV=161 lb    Admission wt=89 lb ??   BMI=16.5 ??    Current fe=864.6 lb 6/4/2020   Revised BMI=18.9/ideal

## 2020-06-04 NOTE — PROGRESS NOTE ADULT - PROBLEM SELECTOR PLAN 3
trending up  will d/c lasix until echo comes back  CK is also elevated, tino h/o multiple falls  f/u bmp

## 2020-06-04 NOTE — SWALLOW BEDSIDE ASSESSMENT ADULT - COMMENTS
Pt seen for bedside swallow evaluation, nonverbal AA+Ox0, +NRB; HOB elevated to 90°. Deconditioned; cachectic.

## 2020-06-04 NOTE — CONSULT NOTE ADULT - ASSESSMENT
89 yo F with HTN who presented with new-onset A fib with RVR and acute, decompensated congestive heart failure given fluid overload on exam, radiologic imaging, and elevated pro-BNP values.     1. Acute, decompensated HF exacerbation:   -Strict Is and Os, daily weights  -IV furosemide, goal net negative 1-1.5L over 24 hours  -check echocardiogram    2. Atrial fibrillation: CHADS2-VASc score is 4 (HTN, age +2, female), consistent with >4% annual risk of stroke if off systemic anticoagulation. Normally would recommend systemic anticoagulation, however patient had multiple recent falls so would hold off until ambulatory status is clarified. Furthermore, patient needs CT head to ensure there is no ICH.   -Rate control with metoprolol, target HR < 110 bpm on average     3. HTN: Metoprolol     ***Note that this is a preliminary note and any recommendations should NOT be carried out until this note is finalized. *** 89 yo F with HTN who presented with new-onset A fib with RVR and acute, decompensated congestive heart failure given fluid overload on exam, radiologic imaging, and elevated pro-BNP values.     1. Acute, decompensated HF exacerbation:   -Strict Is and Os, daily weights  -IV furosemide, goal net negative 1-1.5L over 24 hours  -check echocardiogram    2. Atrial fibrillation: CHADS2-VASc score is 4 (HTN, age +2, female), consistent with >4% annual risk of stroke if off systemic anticoagulation. Normally would recommend systemic anticoagulation, however patient had multiple recent falls so would hold off until ambulatory status is clarified. Furthermore, patient needs CT head to ensure there is no ICH.   -Rate control with metoprolol, target HR < 110 bpm on average     3. HTN: Patient is on losartan-HCTZ and verapamil at home.   -Agree with metoprolol for rate control (change to PO once patient able to tolerate), would not give verapamil in setting fo heart failure  -Would add on losartan 50mg PO daily, titrate as hemodynamically tolerated    ***Note that this is a preliminary note and any recommendations should NOT be carried out until this note is finalized. *** 89 yo F with HTN who presented with new-onset A fib with RVR and acute, decompensated congestive heart failure given fluid overload on exam, radiologic imaging, and elevated pro-BNP values.     1. Acute, decompensated HF exacerbation:   -Strict Is and Os, daily weights  -gentle diuresis with IV furosemide   -check echocardiogram    2. Atrial fibrillation: CHADS2-VASc score is 4 (HTN, age +2, female), consistent with >4% annual risk of stroke if off systemic anticoagulation. Normally would recommend systemic anticoagulation, however patient had multiple recent falls so would hold off until ambulatory status is clarified. Furthermore, patient needs CT head to ensure there is no ICH.   -Rate control with metoprolol, target HR < 110 bpm on average (currently in sinus rhtyhm)    3. HTN: Patient is on losartan-HCTZ and verapamil at home.   -Agree with metoprolol for rate control (currently in sinus rhtyhm), would not give verapamil in setting of heart failure  -Would add on losartan 50mg PO daily, titrate as hemodynamically tolerated

## 2020-06-05 NOTE — PROGRESS NOTE ADULT - PROBLEM SELECTOR PLAN 1
patient was weened off to nasal cannula 2L  covid negx1  pr BNp was elevated, troponin trending up  Patient is not in acute distress  She was found to have b/l infiltrates on cxr  started on rocephin and zithro  could be decompensated HF vs pneumonia   was found to have anion gap metabolic acidosis  Was started on fluids with sodium bicarb additives  Was initially started on lasix, holding off for now in view of elevated creatinine and physical exam  Echo showed gIIDD with ef of 30-35%  will watch for signs of fluid overload  Stop fluids tomorrow and reassess the fluid status

## 2020-06-05 NOTE — CONSULT NOTE ADULT - PROBLEM SELECTOR RECOMMENDATION 4
2/2  comorbidities.  Daughter reports good appetite prior to admission. Diet as tolerated.  SLP recommended dysphagia diet.

## 2020-06-05 NOTE — CONSULT NOTE ADULT - PROBLEM SELECTOR RECOMMENDATION 6
Spoke with the patient's daughter Tammi who expressed feeling overwhelmed as this change in her mother's clinical status is so sudden.  She would like to do everything possible up to the point of CPR and intubation.  She is hopeful her mother will recover but she is realistic.  Palliative care will follow. Spoke with the patient's daughter Tammi who expressed feeling overwhelmed as this change in her mother's clinical status is so sudden.  She would like to do everything possible up to the point of CPR and intubation.  She is hopeful her mother will recover but she is realistic, agreeable for comfort should her mother continue to decline. Palliative care will follow.

## 2020-06-05 NOTE — CONSULT NOTE ADULT - REASON FOR ADMISSION
Recurrent falls noted to have low oxygen saturation by EMS
Recurrent falls noted to have low oxygen saturation by EMS

## 2020-06-05 NOTE — CONSULT NOTE ADULT - PROBLEM SELECTOR RECOMMENDATION 9
Likely  pneumonia. Was on NRB now on NC.  Receiving antibiotics.    CRX Bilateral symmetric infiltrates  -Leukocytosis 22k    -Dilaudid PRN for respiratory distress    Pt is DNR/DNI

## 2020-06-05 NOTE — CONSULT NOTE ADULT - SUBJECTIVE AND OBJECTIVE BOX
HPI:  89 y/o female from home with PMHx of HTN presents to the ED after a fall at home. History from patient is limited as patient is in moderate respiratory distress requiring nonrebreather mask for support. History aided by patient's daughter Tammi. As per daughter, patient has had three mechanical falls since Monday, with most recent fall being this morning. She denies noting any other issues or symptoms out of the ordinary. Per EMS, patient's O2 sat was in the 70s when she was found, and she was placed on NRB mask. At present, patient is in moderate respiratory distress with tachypnea. She is maintaining an O2 saturation of 97% on 100% NRB.    Interval hx: Pt seen sriram examined at the bedside, unresponsive. NAD.  Palliative care to assess hospice eligibility.       PAST MEDICAL & SURGICAL HISTORY:  HTN (hypertension)  No pertinent past medical history  No significant past surgical history      SOCIAL HISTORY:    Admitted from:  HOme with family  Pt has 3 children, her daughter Tammi Boateng makes medical decisions  Substance abuse history: none              Tobacco hx: none                 Alcohol hx: none             Home Opioid hx: none  Protestant: Religious                                    Preferred Language: English    Surrogate/HCP/Guardian:   Tammi Boateng         Phone#: [phone]    FAMILY HISTORY:  No pertinent family history in first degree relatives    Baseline ADLs (prior to admission): independent    Allergies    No Known Allergies    Intolerances      Present Symptoms:   Unable to obtain due to poor mentation]    MEDICATIONS  (STANDING):  cefepime   IVPB      dextrose 5% + sodium chloride 0.45% 1000 milliLiter(s) (75 mL/Hr) IV Continuous <Continuous>  heparin   Injectable 5000 Unit(s) SubCutaneous every 8 hours  vancomycin  IVPB        MEDICATIONS  (PRN):  acetaminophen  Suppository .. 650 milliGRAM(s) Rectal every 6 hours PRN Mild Pain (1 - 3), Moderate Pain (4 - 6), Severe Pain (7 - 10)  metoprolol tartrate Injectable 2.5 milliGRAM(s) IV Push every 6 hours PRN SBP>180, HR >120      PHYSICAL EXAM:    Vital Signs Last 24 Hrs  T(C): 36.7 (2020 11:30), Max: 37.3 (2020 14:59)  T(F): 98 (2020 11:30), Max: 99.2 (2020 14:59)  HR: 89 (2020 11:30) (75 - 89)  BP: 171/76 (2020 11:30) (150/50 - 171/76)  BP(mean): --  RR: 20 (2020 11:30) (18 - 20)  SpO2: 100% (2020 11:30) (99% - 100%)    General: Cachectic, temporal wasting, left frontal bruise, NAD  Karnofsky Performance Score/Palliative Performance Status Version2: 30    %    HEENT: Abrasion to forehead and nose  Lungs: unlabored  CV: S1S2, RRR  GI: soft, non tender on palpation  : urinating   Musculoskeletal: no edema  Skin: fragile, no rash or lesions noted  Neuro: unresponsive  Oral intake ability: unable (2/2 mentation)  Diet:dysphagia    LABS:                        10.9   22.09 )-----------( 233      ( 2020 07:58 )             34.2     06-05    146<H>  |  107  |  57<H>  ----------------------------<  77  3.3<L>   |  20<L>  |  1.83<H>    Ca    8.8      2020 07:58  Phos  3.8     06-05  Mg     2.6     06-05    TPro  7.6  /  Alb  3.4<L>  /  TBili  1.1  /  DBili  x   /  AST  93<H>  /  ALT  42  /  AlkPhos  91  06-05    Urinalysis Basic - ( 2020 20:05 )    Color: Yellow / Appearance: Clear / S.010 / pH: x  Gluc: x / Ketone: Negative  / Bili: Negative / Urobili: Negative   Blood: x / Protein: 15 / Nitrite: Negative   Leuk Esterase: Negative / RBC: 25-50 /HPF / WBC 0-2 /HPF   Sq Epi: x / Non Sq Epi: Few /HPF / Bacteria: Trace /HPF    < from: Xray Chest 1 View- PORTABLE-Routine (20 @ 11:04) >  EXAM:  XR CHEST PORTABLE ROUTINE 1V                            PROCEDURE DATE:  2020          INTERPRETATION:  AP semierect chest on 2020 11:00 AM. Patient has pulmonary congestion. Patient has tested negative for the COVID virus. Patient was admitted with a fall and found on the floor. Patient's oxygen saturation was in the 70s. Patient has atrial fibrillation and hypertension.    Heart is enlarged.    Bilateral symmetric infiltrates moderate degree suggests CHF.    Congestive findings may be slightly improved compared to Taylor 3.    IMPRESSION: Significant CHF somewhat improved.    < end of copied text >    < from: CT Head No Cont (20 @ 10:25) >  EXAM:  CT BRAIN                            PROCEDURE DATE:  2020          INTERPRETATION:  CLINICAL INDICATION: Status post fall.    TECHNIQUE: CT of the head was performed without the administration of intravenous contrast.    COMPARISON: None available.    FINDINGS:    There is no evidence of acute infarction, intracranial hemorrhage or mass lesion.     There is hypoattenuation of the subcortical and periventricular white matter, which is nonspecific finding, but most likely represents sequela of moderately severe chronic microvascular ischemic disease. There are atherosclerotic calcifications of the cavernous and supraclinoid internal carotid arteries an intradural vertebral artery bilaterally. There is prominence of the cortical sulci related to underlying brain parenchymal volume loss.     Incidentally noted is enlarged, partially empty sella turcica.    There is no evidence of obstructive hydrocephalus. There are no extra-axial fluid collections.    The visualized intraorbital contents are normal. The imaged portions of the paranasal sinuses are aerated. The mastoid air cells are clear. There is a nonspecific lucent lesion in the left parietal calvarium measuring 0.9 cm (series 3, image 50), incompletely evaluated. Thereis soft tissue swelling/subcutaneous hematoma of the left frontal scalp and forehead. There is no evidence of acute calvarial fracture.      IMPRESSION:    Soft tissue swelling/subcutaneous hematoma of the left frontal scalp and forehead.    No acuteintracranial findings.    < end of copied text >    RADIOLOGY & ADDITIONAL STUDIES:  Reviewed    ADVANCE DIRECTIVES: DNR/DNI

## 2020-06-05 NOTE — PROGRESS NOTE ADULT - SUBJECTIVE AND OBJECTIVE BOX
PGY 1 Note discussed with supervising resident and primary attending    Patient is a 89y old  Female who presents with a chief complaint of Recurrent falls noted to have low oxygen saturation by EMS (2020 12:41)      INTERVAL HPI/OVERNIGHT EVENTS:   patient was found to have lethargy this morning, which improved during the day. She was found to have anion gap metabolic acidosis. She was started on gentle hydration with bicarb. She was also found to have systolic dysfunction with ef of 30-35%.   Patient looks dehydrated  Palliative was consulted, family decided to do whatever possible till the point of CPr.    MEDICATIONS  (STANDING):  cefepime   IVPB      dextrose 5% + sodium chloride 0.45% 1000 milliLiter(s) (75 mL/Hr) IV Continuous <Continuous>  heparin   Injectable 5000 Unit(s) SubCutaneous every 8 hours  vancomycin  IVPB        MEDICATIONS  (PRN):  acetaminophen  Suppository .. 650 milliGRAM(s) Rectal every 6 hours PRN Mild Pain (1 - 3), Moderate Pain (4 - 6), Severe Pain (7 - 10)  metoprolol tartrate Injectable 2.5 milliGRAM(s) IV Push every 6 hours PRN SBP>180, HR >120      __________________________________________________        Vital Signs Last 24 Hrs  T(C): 36.7 (2020 11:30), Max: 37.3 (2020 14:59)  T(F): 98 (2020 11:30), Max: 99.2 (2020 14:59)  HR: 89 (2020 11:30) (75 - 89)  BP: 171/76 (2020 11:30) (150/50 - 171/76)  BP(mean): --  RR: 20 (2020 11:30) (18 - 20)  SpO2: 100% (2020 11:30) (99% - 100%)    ________________________________________________  PHYSICAL EXAM:  GENERAL: mild lethargy  CHEST/LUNG: crackles  HEART: S1 S2  regular; no murmurs, gallops or rubs  ABDOMEN: Soft, Nontender, Nondistended; Bowel sounds present  EXTREMITIES: no cyanosis; no edema; no calf tenderness  SKIN: dry  NERVOUS SYSTEM: waking up on command ahving small feed when fed with spoon  _________________________________________________  LABS:                        10.9   22.09 )-----------( 233      ( 2020 07:58 )             34.2     06-05    146<H>  |  107  |  57<H>  ----------------------------<  77  3.3<L>   |  20<L>  |  1.83<H>    Ca    8.8      2020 07:58  Phos  3.8     06-05  Mg     2.6     06-05    TPro  7.6  /  Alb  3.4<L>  /  TBili  1.1  /  DBili  x   /  AST  93<H>  /  ALT  42  /  AlkPhos  91  06-05      Urinalysis Basic - ( 2020 20:05 )    Color: Yellow / Appearance: Clear / S.010 / pH: x  Gluc: x / Ketone: Negative  / Bili: Negative / Urobili: Negative   Blood: x / Protein: 15 / Nitrite: Negative   Leuk Esterase: Negative / RBC: 25-50 /HPF / WBC 0-2 /HPF   Sq Epi: x / Non Sq Epi: Few /HPF / Bacteria: Trace /HPF      CAPILLARY BLOOD GLUCOSE      POCT Blood Glucose.: 109 mg/dL (2020 11:48)  POCT Blood Glucose.: 96 mg/dL (2020 06:08)  POCT Blood Glucose.: 98 mg/dL (2020 00:26)  POCT Blood Glucose.: 108 mg/dL (2020 17:39)        RADIOLOGY & ADDITIONAL TESTS:    Imaging Personally Reviewed:  YES/NO    Consultant(s) Notes Reviewed:   YES/ No    Care Discussed with Consultants :     Plan of care was discussed with patient and /or primary care giver; all questions and concerns were addressed and care was aligned with patient's wishes.

## 2020-06-05 NOTE — CONSULT NOTE ADULT - PROBLEM SELECTOR RECOMMENDATION 5
Per daughter pt has been independent up until this past week noted to unsteady s/p 3 falls.  With Soft tissue swelling/subcutaneous hematoma    PT recommending [facility]

## 2020-06-05 NOTE — CONSULT NOTE ADULT - ATTENDING COMMENTS
Thank you for the courtesy of a consultation, please contact me for any additional questions
89y F admitted s/p fall with acute hypoxic respiratory distress, on antibiotics for PNA, O2 weaned to NC, with acute encephalopathy likely superimposed on some degree of vascular dementia. DNR/DNI on file. Palliative will follow for ongoing goals of care pending clinical course. Continue current medical management.

## 2020-06-05 NOTE — CONSULT NOTE ADULT - PROBLEM SELECTOR RECOMMENDATION 2
Likely early onset dementia. Daughter reports pt is forgetful, s/p mechanical falls 3x this week. Likely late onset dementia. Daughter reports pt is forgetful, s/p mechanical falls 3x this week.

## 2020-06-05 NOTE — PROGRESS NOTE ADULT - PROBLEM SELECTOR PLAN 3
trending up  will hold off lasix  patient looks dehydrated, will continue with fluids given AGMA   f/u bmp

## 2020-06-05 NOTE — PROGRESS NOTE ADULT - PROBLEM SELECTOR PLAN 2
EKG on admission shows afib with RVR  s/p cardizem 10mg IV push x1 in ED  cardiac enzyme elevation noted, trending up likely demand ischemia  on lopressor   Patient was taking verapamil at home, probably had afib  will continue with lopressor in setting of CHF  c/w tele

## 2020-06-06 NOTE — PROGRESS NOTE ADULT - SUBJECTIVE AND OBJECTIVE BOX
Patient is a 89y old  Female who presents with a chief complaint of Recurrent falls noted to have low oxygen saturation by EMS (05 Jun 2020 14:53)    Patient was seen and examined at bedside today  More alert then yesterday   Denies any complains     INTERVAL HPI/OVERNIGHT EVENTS:  T(C): 36.9 (06-06-20 @ 04:37), Max: 36.9 (06-06-20 @ 04:37)  HR: 69 (06-06-20 @ 04:37) (69 - 94)  BP: 159/77 (06-06-20 @ 04:37) (159/77 - 167/75)  RR: 21 (06-06-20 @ 04:37) (20 - 21)  SpO2: 97% (06-06-20 @ 04:37) (91% - 97%)  Wt(kg): --  I&O's Summary    05 Jun 2020 07:01  -  06 Jun 2020 07:00  --------------------------------------------------------  IN: 0 mL / OUT: 875 mL / NET: -875 mL        REVIEW OF SYSTEMS: not obtained     MEDICATIONS  (STANDING):  cefepime   IVPB      cefepime   IVPB 1000 milliGRAM(s) IV Intermittent every 24 hours  dextrose 5% + sodium chloride 0.45% 1000 milliLiter(s) (75 mL/Hr) IV Continuous <Continuous>  heparin   Injectable 5000 Unit(s) SubCutaneous every 8 hours  losartan 50 milliGRAM(s) Oral daily  metoprolol tartrate 12.5 milliGRAM(s) Oral two times a day  vancomycin  IVPB      vancomycin  IVPB 750 milliGRAM(s) IV Intermittent every 24 hours    MEDICATIONS  (PRN):  acetaminophen  Suppository .. 650 milliGRAM(s) Rectal every 6 hours PRN Mild Pain (1 - 3), Moderate Pain (4 - 6), Severe Pain (7 - 10)      PHYSICAL EXAM:  GENERAL: NAD, dehydrated   NERVOUS SYSTEM:  Alert & Oriented X1, Good concentration  CHEST/LUNG: BL crepitations   HEART: Regular rate and rhythm; No murmurs, rubs, or gallops  ABDOMEN: Soft, Nontender, Nondistended; Bowel sounds present  EXTREMITIES:  2+ Peripheral Pulses, No clubbing, cyanosis, or edema  SKIN: bruise in forehead and BL upper extremities     LABS:                        11.3   22.73 )-----------( 229      ( 06 Jun 2020 07:10 )             36.4       149<H>  |  111<H>  |  65<H>  ----------------------------<  141<H>  2.9<LL>   |  28  |  1.71<H>    Ca    9.1      06 Jun 2020 07:10  Phos  3.3     06-06  Mg     2.8     06-06    TPro  7.6  /  Alb  3.5  /  TBili  1.3<H>  /  DBili  x   /  AST  52<H>  /  ALT  45  /  AlkPhos  90  06-06        CAPILLARY BLOOD GLUCOSE      POCT Blood Glucose.: 136 mg/dL (06 Jun 2020 08:01)  POCT Blood Glucose.: 139 mg/dL (06 Jun 2020 00:24)  POCT Blood Glucose.: 127 mg/dL (05 Jun 2020 18:04)

## 2020-06-07 NOTE — PROGRESS NOTE ADULT - SUBJECTIVE AND OBJECTIVE BOX
Patient is a 89y old  Female who presents with a chief complaint of Recurrent falls noted to have low oxygen saturation by EMS (06 Jun 2020 15:58)    Patient was seen and examined at bedside   Still looks dehydrated, more responsive     INTERVAL HPI/OVERNIGHT EVENTS:  T(C): 36.6 (06-07-20 @ 05:41), Max: 36.8 (06-06-20 @ 21:32)  HR: 107 (06-07-20 @ 05:41) (58 - 107)  BP: 138/77 (06-07-20 @ 05:41) (126/79 - 138/77)  RR: 20 (06-07-20 @ 05:41) (20 - 21)  SpO2: 100% (06-07-20 @ 05:41) (100% - 100%)  Wt(kg): --  I&O's Summary    06 Jun 2020 07:01  -  07 Jun 2020 07:00  --------------------------------------------------------  IN: 0 mL / OUT: 600 mL / NET: -600 mL        REVIEW OF SYSTEMS: not obtained     MEDICATIONS  (STANDING):  cefepime   IVPB      cefepime   IVPB 1000 milliGRAM(s) IV Intermittent every 24 hours  dextrose 5% + sodium chloride 0.45% 1000 milliLiter(s) (75 mL/Hr) IV Continuous <Continuous>  heparin   Injectable 5000 Unit(s) SubCutaneous every 8 hours  metoprolol tartrate 12.5 milliGRAM(s) Oral two times a day  vancomycin  IVPB      vancomycin  IVPB 750 milliGRAM(s) IV Intermittent every 24 hours    MEDICATIONS  (PRN):  acetaminophen  Suppository .. 650 milliGRAM(s) Rectal every 6 hours PRN Mild Pain (1 - 3), Moderate Pain (4 - 6), Severe Pain (7 - 10)      PHYSICAL EXAM:  GENERAL: NAD  NERVOUS SYSTEM:  Alert not oriented, was able to follow simple commands, no focal deficit noted   CHEST/LUNG: right sided crepitations improved from before   HEART: Regular rate and rhythm; No murmurs, rubs, or gallops  ABDOMEN: Soft, Nontender, Nondistended; Bowel sounds present, Chang catheter in place   EXTREMITIES:  2+ Peripheral Pulses, No clubbing, cyanosis, or edema  SKIN: bruise and abrasion on forehead, No rashes or lesions    LABS:                        11.3   22.73 )-----------( 229      ( 06 Jun 2020 07:10 )             36.4       149<H>  |  111<H>  |  65<H>  ----------------------------<  141<H>  2.9<LL>   |  28  |  1.71<H>    Ca    9.1      06 Jun 2020 07:10  Phos  3.3     06-06  Mg     2.8     06-06    TPro  7.6  /  Alb  3.5  /  TBili  1.3<H>  /  DBili  x   /  AST  52<H>  /  ALT  45  /  AlkPhos  90  06-06        CAPILLARY BLOOD GLUCOSE      POCT Blood Glucose.: 135 mg/dL (07 Jun 2020 12:22)  POCT Blood Glucose.: 104 mg/dL (07 Jun 2020 06:19)  POCT Blood Glucose.: 111 mg/dL (07 Jun 2020 00:11)  POCT Blood Glucose.: 143 mg/dL (06 Jun 2020 21:49)

## 2020-06-08 NOTE — PROGRESS NOTE ADULT - PROBLEM SELECTOR PLAN 2
Scr on admission 1.49 trending up today 2.30. Noted with hypernatremia NA+ 153.  Receiving IVF.      Avoid NSAIDs. Scr on admission 1.49 trending up today 2.30. Noted with hypernatremia NA+ 153.  Receiving gentle IVF.      Avoid NSAIDs.

## 2020-06-08 NOTE — PROGRESS NOTE ADULT - ATTENDING COMMENTS
Patient is a 89y old  Female who presents with a chief complaint of Recurrent falls noted to have low oxygen saturation by EMS (08 Jun 2020 13:25)    Patient was seen and examined at bedside   More lethargic today     REVIEW OF SYSTEMS: not obtained   PHYSICAL EXAM:  GENERAL: dehydrated   NERVOUS SYSTEM:  Alert but not following commands consistently as yesterday  CHEST/LUNG: right sided crepitation improved   HEART: Regular rate and rhythm; No murmurs, rubs, or gallops  ABDOMEN: Soft, Nontender, Nondistended; Bowel sounds present  EXTREMITIES:  2+ Peripheral Pulses, No clubbing, cyanosis, or edema  SKIN: multiple bruise and lacerations from repeated falls     LABS:                        11.7   15.63 )-----------( 229      ( 08 Jun 2020 07:04 )             37.8     154<H>  |  118<H>  |  95<H>  ----------------------------<  160<H>  3.3<L>   |  27  |  2.27<H>    Assessment and plan:   1. Acute hypoxic respiratory failure due to volume overload from acute on chronic systolic and diastolic CHF   2. CAP   3. TAWANNA  4. Transaminitis   5. Atrial fibrillation with RVR   6. Moderate MR and moderate AS   7. Recurrent Falls  8. HTN     Plan:   Will increase hydration   Monitor renal function, hold losartan, Metoprolol 2.5mg q6hrs IVP for BP and HR   Cont abx coverage with vanc and cefepime, Vanc trough before 4th dose   DC Chang and TOV   PT eval [facility]  Dysphagia diet, RN reports poor PO intake   Palliative care consult appreciated   DNR/ DNI   Discussed current medical status. Explained about risk of fluid overload due to poor cardiac function, Discussed about poor prognosis. Daughter wants to come see her tomorrow.

## 2020-06-08 NOTE — PROGRESS NOTE ADULT - PROBLEM SELECTOR PLAN 3
trending up  will hold off lasix  patient looks dehydrated, will continue with fluids  Has hypernatremia  will continue with d5 half NS  f/u urine lytes  f/u bmp

## 2020-06-08 NOTE — PROGRESS NOTE ADULT - SUBJECTIVE AND OBJECTIVE BOX
PRESENTING CC:    SUBJ:     In summary, this is a 89 yo F with dementia and HTN who was brought in by EMS after a fall at home, noted to be in respiratory distress and atrial fibrillation. Patient is A&O x1-2, unable to provide any additional information. Echocardiogram showed severe segmental LV dysfunction.     Overnight, there were no acute events.       ------------------------  PMH: As above  No significant past surgical history    Allergies    No Known Allergies    MEDICATIONS  (STANDING):  cefepime   IVPB      cefepime   IVPB 1000 milliGRAM(s) IV Intermittent every 24 hours  dextrose 5% + sodium chloride 0.45% 1000 milliLiter(s) (75 mL/Hr) IV Continuous <Continuous>  heparin   Injectable 5000 Unit(s) SubCutaneous every 8 hours  metoprolol tartrate 12.5 milliGRAM(s) Oral two times a day  potassium chloride  10 mEq/100 mL IVPB 10 milliEquivalent(s) IV Intermittent every 1 hour  vancomycin  IVPB      vancomycin  IVPB 750 milliGRAM(s) IV Intermittent every 24 hours    MEDICATIONS  (PRN):  acetaminophen  Suppository .. 650 milliGRAM(s) Rectal every 6 hours PRN Mild Pain (1 - 3), Moderate Pain (4 - 6), Severe Pain (7 - 10)      FAMILY HISTORY:  No pertinent family history in first degree relatives    Reviewed; no change from my prior note    SOCIAL HISTORY:  Reviewed, no change from my prior note    REVIEW OF SYSTEMS:  Constitutional: [ ] fever, [ ]weight loss,  [ ]fatigue  Eyes: [ ] visual changes  Respiratory: [ ]shortness of breath;  [ ] cough, [ ]wheezing, [ ]chills, [ ]hemoptysis  Cardiovascular: [ ] chest pain, [ ]palpitations, [ ]dizziness,  [ ]leg swelling [ ]syncope  Gastrointestinal: [ ] abdominal pain, [ ]nausea, [ ]vomiting,  [ ]diarrhea   Genitourinary: [ ] dysuria, [ ] hematuria  Neurologic: [ ] headaches [ ] tremors [ ] weakness [ ] lightheadedness  Skin: [ ] itching, [ ]burning, [ ] rashes  Endocrine: [ ] heat or cold intolerance  Musculoskeletal: [ ] joint pain or swelling; [ ] muscle, back, or extremity pain  Psychiatric: [ ] depression, [ ]anxiety, [ ]mood swings, or [ ]difficulty sleeping  Hematologic: [ ] easy bruising, [ ] bleeding gums    [x] All remaining systems negative except as per above.   [  ] Unable to obtain    Vital Signs Last 24 Hrs  T(C): 36.7 (08 Jun 2020 05:59), Max: 37.1 (07 Jun 2020 22:01)  T(F): 98 (08 Jun 2020 05:59), Max: 98.7 (07 Jun 2020 22:01)  HR: 79 (08 Jun 2020 07:09) (79 - 134)  BP: 165/80 (08 Jun 2020 05:59) (138/81 - 165/80)  BP(mean): --  RR: 20 (08 Jun 2020 05:59) (20 - 20)  SpO2: 99% (08 Jun 2020 05:59) (99% - 100%)  I&O's Summary    07 Jun 2020 07:01  -  08 Jun 2020 07:00  --------------------------------------------------------  IN: 0 mL / OUT: 501 mL / NET: -501 mL        PHYSICAL EXAM:  see below    LABS:  06-08    153<H>  |  113<H>  |  93<H>  ----------------------------<  128<H>  2.7<LL>   |  28  |  2.30<H>    Ca    9.1      08 Jun 2020 07:04  Phos  3.4     06-08  Mg     3.1     06-08    TPro  6.5  /  Alb  2.9<L>  /  TBili  1.2  /  DBili  x   /  AST  679<H>  /  ALT  543<H>  /  AlkPhos  129<H>  06-08    Creatinine Trend: 2.30<--, 1.71<--, 1.83<--, 1.68<--, 1.49<--                        11.7   15.63 )-----------( 229      ( 08 Jun 2020 07:04 )             37.8         ECHO: < from: Transthoracic Echocardiogram (06.05.20 @ 15:46) >  CONCLUSIONS:  1. Mitral annular calcification. Moderate mitral  regurgitation.  2. Calcified trileaflet aortic valve with decreased  opening. Peak transaortic valve gradient equals 21.5 mm Hg,  estimated aortic valve area equals 1.2 sqcm (bycontinuity  equation), consistent with moderate aortic stenosis.The  dimensiuonless index is .40. Mild aortic regurgitation.  3. Aortic Root: 3.2 cm.    4. Moderately dilated left atrium.  LA volume index = 47  cc/m2.  5. Normal left ventricular internal dimensions and wall  thicknesses.  6. Severe segmental left ventricular systolic  dysfunction.The septum,apex,infero-lateral and distal  inferior walls are hypokinetic EF=30-35%.  7. Grade II diastolic dysfunction.  8. Normal right atrium.  9. Normal right ventricular size with decreased RV systolic  function (TAPSE 1.2 cm, tissue doppler .08m/s).  10. RV systolic pressure is moderately increased at  51 mm  Hg.  11. There is moderate tricuspid regurgitation.  12. Normal pulmonic valve.  13. Normal pericardium with no pericardial effusion.  14. Right pleural effusion.    < end of copied text >

## 2020-06-08 NOTE — PROGRESS NOTE ADULT - SUBJECTIVE AND OBJECTIVE BOX
PGY 1 Note discussed with supervising resident and primary attending    Patient is a 89y old  Female who presents with a chief complaint of Recurrent falls noted to have low oxygen saturation by EMS (08 Jun 2020 09:36)      INTERVAL HPI/OVERNIGHT EVENTS:   Patient looks dehydarted.   Soidum was high, increased fluids to 100ml/hr    MEDICATIONS  (STANDING):  cefepime   IVPB      cefepime   IVPB 1000 milliGRAM(s) IV Intermittent every 24 hours  dextrose 5% + sodium chloride 0.45% 1000 milliLiter(s) (100 mL/Hr) IV Continuous <Continuous>  heparin   Injectable 5000 Unit(s) SubCutaneous every 8 hours  metoprolol tartrate 12.5 milliGRAM(s) Oral two times a day  potassium chloride  10 mEq/100 mL IVPB 10 milliEquivalent(s) IV Intermittent every 1 hour  tamsulosin 0.4 milliGRAM(s) Oral at bedtime  vancomycin  IVPB      vancomycin  IVPB 750 milliGRAM(s) IV Intermittent every 24 hours    MEDICATIONS  (PRN):  acetaminophen  Suppository .. 650 milliGRAM(s) Rectal every 6 hours PRN Mild Pain (1 - 3), Moderate Pain (4 - 6), Severe Pain (7 - 10)      __________________________________________________        Vital Signs Last 24 Hrs  T(C): 36.7 (08 Jun 2020 05:59), Max: 37.1 (07 Jun 2020 22:01)  T(F): 98 (08 Jun 2020 05:59), Max: 98.7 (07 Jun 2020 22:01)  HR: 79 (08 Jun 2020 07:09) (79 - 134)  BP: 165/80 (08 Jun 2020 05:59) (138/81 - 165/80)  BP(mean): --  RR: 20 (08 Jun 2020 05:59) (20 - 20)  SpO2: 99% (08 Jun 2020 05:59) (99% - 100%)    ________________________________________________  PHYSICAL EXAM:  GENERAL: NAD  CHEST/LUNG: crackels on b/l lung , more on left   HEART: S1 S2  irregular; no murmurs, gallops or rubs  ABDOMEN: Soft, Nontender, Nondistended; Bowel sounds present, has randall  EXTREMITIES: dry  SKIN: dry and dehydrated  NERVOUS SYSTEM:  Awake, AOx2    _________________________________________________  LABS:                        11.7   15.63 )-----------( 229      ( 08 Jun 2020 07:04 )             37.8     06-08    153<H>  |  113<H>  |  93<H>  ----------------------------<  128<H>  2.7<LL>   |  28  |  2.30<H>    Ca    9.1      08 Jun 2020 07:04  Phos  3.4     06-08  Mg     3.1     06-08    TPro  6.5  /  Alb  2.9<L>  /  TBili  1.2  /  DBili  x   /  AST  679<H>  /  ALT  543<H>  /  AlkPhos  129<H>  06-08        CAPILLARY BLOOD GLUCOSE      POCT Blood Glucose.: 121 mg/dL (08 Jun 2020 06:14)  POCT Blood Glucose.: 133 mg/dL (08 Jun 2020 00:20)  POCT Blood Glucose.: 135 mg/dL (07 Jun 2020 12:22)        RADIOLOGY & ADDITIONAL TESTS:    Imaging Personally Reviewed:  YES/NO    Consultant(s) Notes Reviewed:   YES/ No    Care Discussed with Consultants :     Plan of care was discussed with patient and /or primary care giver; all questions and concerns were addressed and care was aligned with patient's wishes.

## 2020-06-08 NOTE — PROGRESS NOTE ADULT - ATTENDING COMMENTS
89y F admitted s/p fall with acute hypoxic respiratory distress, on antibiotics for PNA, O2 weaned to NC, with acute encephalopathy likely superimposed on some degree of vascular dementia. Worsening renal function. Poor po intake. Echo showed EF 30-35%, mod MR, mod AS. Cardio following. DNR/DNI on file. Guarded-poor prognosis. Hospice eligible. Palliative team will follow up with daughter for ongoing goals of care dsiscussion after her visit.

## 2020-06-08 NOTE — PROGRESS NOTE ADULT - PROBLEM SELECTOR PLAN 1
patient was weened off to nasal cannula 4l  covid negx1  pr BNp was elevated, troponin trending up  Patient is not in acute distress  She was found to have b/l infiltrates on cxr  could be decompensated HF vs pneumonia   Was started on fluids with potassium additives  Was initially started on lasix, holding off for now in view of elevated creatinine and physical exam  Echo showed gIIDD with ef of 30-35%  will watch for signs of fluid overload  Stop fluids tomorrow and reassess the fluid status

## 2020-06-08 NOTE — PROGRESS NOTE ADULT - SUBJECTIVE AND OBJECTIVE BOX
OVERNIGHT EVENTS: Pt unable to eat, currently NPO    Present Symptoms:    Unable to obtain due to poor mentation    MEDICATIONS  (STANDING):  cefepime   IVPB      cefepime   IVPB 1000 milliGRAM(s) IV Intermittent every 24 hours  dextrose 5% + sodium chloride 0.45% 1000 milliLiter(s) (100 mL/Hr) IV Continuous <Continuous>  heparin   Injectable 5000 Unit(s) SubCutaneous every 8 hours  metoprolol tartrate 12.5 milliGRAM(s) Oral two times a day  sodium chloride 0.45%. 1000 milliLiter(s) (500 mL/Hr) IV Continuous <Continuous>  tamsulosin 0.4 milliGRAM(s) Oral at bedtime  vancomycin  IVPB      vancomycin  IVPB 750 milliGRAM(s) IV Intermittent every 24 hours    MEDICATIONS  (PRN):  acetaminophen  Suppository .. 650 milliGRAM(s) Rectal every 6 hours PRN Mild Pain (1 - 3), Moderate Pain (4 - 6), Severe Pain (7 - 10)      PHYSICAL EXAM:  Vital Signs Last 24 Hrs  T(C): 36.7 (08 Jun 2020 05:59), Max: 37.1 (07 Jun 2020 22:01)  T(F): 98 (08 Jun 2020 05:59), Max: 98.7 (07 Jun 2020 22:01)  HR: 79 (08 Jun 2020 07:09) (79 - 134)  BP: 165/80 (08 Jun 2020 05:59) (138/81 - 165/80)  BP(mean): --  RR: 20 (08 Jun 2020 05:59) (20 - 20)  SpO2: 99% (08 Jun 2020 05:59) (99% - 100%)    General: Cachectic, temporal wasting, left frontal bruise, NAD  Karnofsky Performance Score/Palliative Performance Status Version2: 30    %    HEENT: Abrasion to forehead and nose  Lungs: unlabored on NRB  CV: S1S2, RRR  GI: soft, non tender on palpation  : urinating   Musculoskeletal: no edema  Skin: fragile, no rash or lesions noted  Neuro: unresponsive  Oral intake ability: unable (2/2 mentation)  Diet: dysphagia        LABS:                          11.7   15.63 )-----------( 229      ( 08 Jun 2020 07:04 )             37.8     06-08    153<H>  |  113<H>  |  93<H>  ----------------------------<  128<H>  2.7<LL>   |  28  |  2.30<H>    Ca    9.1      08 Jun 2020 07:04  Phos  3.4     06-08  Mg     3.1     06-08    TPro  6.5  /  Alb  2.9<L>  /  TBili  1.2  /  DBili  x   /  AST  679<H>  /  ALT  543<H>  /  AlkPhos  129<H>  06-08        RADIOLOGY & ADDITIONAL STUDIES: Reviewed    ADVANCE DIRECTIVES: DNR/DNI OVERNIGHT EVENTS: Pt unable to eat, currently NPO    Present Symptoms:    Unable to obtain due to poor mentation    MEDICATIONS  (STANDING):  cefepime   IVPB      cefepime   IVPB 1000 milliGRAM(s) IV Intermittent every 24 hours  dextrose 5% + sodium chloride 0.45% 1000 milliLiter(s) (100 mL/Hr) IV Continuous <Continuous>  heparin   Injectable 5000 Unit(s) SubCutaneous every 8 hours  metoprolol tartrate 12.5 milliGRAM(s) Oral two times a day  sodium chloride 0.45%. 1000 milliLiter(s) (500 mL/Hr) IV Continuous <Continuous>  tamsulosin 0.4 milliGRAM(s) Oral at bedtime  vancomycin  IVPB      vancomycin  IVPB 750 milliGRAM(s) IV Intermittent every 24 hours    MEDICATIONS  (PRN):  acetaminophen  Suppository .. 650 milliGRAM(s) Rectal every 6 hours PRN Mild Pain (1 - 3), Moderate Pain (4 - 6), Severe Pain (7 - 10)      PHYSICAL EXAM:  Vital Signs Last 24 Hrs  T(C): 36.7 (08 Jun 2020 05:59), Max: 37.1 (07 Jun 2020 22:01)  T(F): 98 (08 Jun 2020 05:59), Max: 98.7 (07 Jun 2020 22:01)  HR: 79 (08 Jun 2020 07:09) (79 - 134)  BP: 165/80 (08 Jun 2020 05:59) (138/81 - 165/80)  BP(mean): --  RR: 20 (08 Jun 2020 05:59) (20 - 20)  SpO2: 99% (08 Jun 2020 05:59) (99% - 100%)    General: Cachectic, temporal wasting, left frontal bruise, NAD  Karnofsky Performance Score/Palliative Performance Status Version2: 20 %    HEENT: Abrasion to forehead and nose  Lungs: unlabored on NRB  CV: S1S2, RRR  GI: soft, non tender on palpation  : urinating   Musculoskeletal: no edema  Skin: fragile, no rash or lesions noted  Neuro: unresponsive  Oral intake ability: unable (2/2 mentation)  Diet: dysphagia        LABS:                          11.7   15.63 )-----------( 229      ( 08 Jun 2020 07:04 )             37.8     06-08    153<H>  |  113<H>  |  93<H>  ----------------------------<  128<H>  2.7<LL>   |  28  |  2.30<H>    Ca    9.1      08 Jun 2020 07:04  Phos  3.4     06-08  Mg     3.1     06-08    TPro  6.5  /  Alb  2.9<L>  /  TBili  1.2  /  DBili  x   /  AST  679<H>  /  ALT  543<H>  /  AlkPhos  129<H>  06-08        RADIOLOGY & ADDITIONAL STUDIES: Reviewed    ADVANCE DIRECTIVES: DNR/DNI

## 2020-06-08 NOTE — PROGRESS NOTE ADULT - PROBLEM SELECTOR PLAN 1
Likely  pneumonia. Was on NRB now on NC.  Receiving antibiotics.    CRX Bilateral symmetric infiltrates  -Leukocytosis trending down 15.6k    -Dilaudid PRN for respiratory distress    Pt is DNR/DNI. with sepsis, Likely  pneumonia. Was on NRB now on NC.  Receiving antibiotics.    CRX Bilateral symmetric infiltrates. COVID 19 neg. Echo showed EF 30-35%, mod MR, mod AS.   -Leukocytosis trending down 15.6k    -Dilaudid PRN for respiratory distress  -diuretics on hold due to possible overdiuresis    Pt is DNR/DNI.

## 2020-06-08 NOTE — PROGRESS NOTE ADULT - PROBLEM SELECTOR PLAN 3
Likely late onset dementia. Daughter reports pt is forgetful, s/p mechanical falls 3x this week. Acute encephalopathy likely 2/2 sepsis,  superimposed on some degree of underlying dementia,  Daughter reports pt is forgetful, s/p mechanical falls 3x this week. CT head showed scalp hematoma, no acute intracranial findings.

## 2020-06-09 NOTE — PROGRESS NOTE ADULT - SUBJECTIVE AND OBJECTIVE BOX
PGY1 Note discussed with Supervising Resident and Primary Attending.    Patient is a 89y old  Female who presents with a chief complaint of Recurrent falls noted to have low oxygen saturation by EMS (08 Jun 2020 13:25)      INTERVAL HPI/OVERNIGHT EVENTS :  PT seen and examined at bed side  pt got transferred from other floor  mental status not good in morning, responds to pain  Pt given metoprolol 5mg iv push   iv fluids continued   will repeat xray chest    ***********************************************************************************************************    MEDICATIONS  (STANDING):  cefepime   IVPB      cefepime   IVPB 1000 milliGRAM(s) IV Intermittent every 24 hours  dextrose 5% + sodium chloride 0.45% 1000 milliLiter(s) (100 mL/Hr) IV Continuous <Continuous>  dextrose 5% + sodium chloride 0.45% 1000 milliLiter(s) (100 mL/Hr) IV Continuous <Continuous>  heparin   Injectable 5000 Unit(s) SubCutaneous every 8 hours  metoprolol tartrate 25 milliGRAM(s) Oral two times a day  metoprolol tartrate 25 milliGRAM(s) Oral once  metoprolol tartrate Injectable 5 milliGRAM(s) IV Push once  potassium chloride  10 mEq/100 mL IVPB 10 milliEquivalent(s) IV Intermittent every 1 hour  sodium chloride 0.45%. 1000 milliLiter(s) (500 mL/Hr) IV Continuous <Continuous>  tamsulosin 0.4 milliGRAM(s) Oral at bedtime  vancomycin  IVPB      vancomycin  IVPB 750 milliGRAM(s) IV Intermittent every 24 hours    MEDICATIONS  (PRN):  acetaminophen  Suppository .. 650 milliGRAM(s) Rectal every 6 hours PRN Mild Pain (1 - 3), Moderate Pain (4 - 6), Severe Pain (7 - 10)      ***********************************************************************************************************    Allergies    No Known Allergies    Intolerances          ***********************************************************************************************************    Vital Signs Last 24 Hrs  T(C): 36.4 (09 Jun 2020 08:07), Max: 36.8 (08 Jun 2020 14:46)  T(F): 97.6 (09 Jun 2020 08:07), Max: 98.2 (08 Jun 2020 14:46)  HR: 121 (09 Jun 2020 08:07) (90 - 121)  BP: 154/75 (09 Jun 2020 08:07) (138/88 - 154/75)  BP(mean): --  RR: 17 (09 Jun 2020 08:07) (17 - 21)  SpO2: 98% (09 Jun 2020 08:07) (97% - 100%)    ***********************************************************************************************************    PHYSICAL EXAM :  GENERAL: NAD  CHEST/LUNG: crackels on b/l lung ,L>R  HEART: S1 S2  irregular; no murmurs,   ABDOMEN: Soft, Nontender, Nondistended; Bowel sounds present, has randall  EXTREMITIES: dry, EDEMA +1BLE  SKIN: dry and dehydrated  NERVOUS SYSTEM:  moaning   **********************************************************************************************************  LABS:                          11.3   11.53 )-----------( 222      ( 09 Jun 2020 07:57 )             37.1     06-09    153<H>  |  117<H>  |  92<H>  ----------------------------<  140<H>  3.2<L>   |  26  |  2.09<H>    Ca    8.7      09 Jun 2020 07:57  Phos  2.6     06-09  Mg     3.0     06-09    TPro  6.4  /  Alb  3.1<L>  /  TBili  1.2  /  DBili  x   /  AST  262<H>  /  ALT  388<H>  /  AlkPhos  106  06-09        CAPILLARY BLOOD GLUCOSE      POCT Blood Glucose.: 146 mg/dL (09 Jun 2020 08:42)  POCT Blood Glucose.: 117 mg/dL (09 Jun 2020 06:26)  POCT Blood Glucose.: 113 mg/dL (09 Jun 2020 00:18)  POCT Blood Glucose.: 166 mg/dL (08 Jun 2020 17:32)      **********************************************************************************************************    RADIOLOGY & ADDITIONAL TESTS:   No radiological imaging was required    Imaging Personally Reviewed   :  [x ] YES  [ ] NO    Consultant(s) Notes Reviewed :  [x ] YES  [ ] NO

## 2020-06-09 NOTE — PROGRESS NOTE ADULT - PROBLEM SELECTOR PLAN 1
patient was weened off to nasal cannula   covid negx1 6/4  pr BNp was elevated, troponin trending up, b/l infiltrates on cxr  - decompensated HF vs pneumonia   - on fluids with potassium additives  Was initially started on lasix, holding off for now in view of elevated creatinine and physical exam  Echo showed G2DD with EF 30-35%  -MONITOR for  fluid overload

## 2020-06-09 NOTE — PROGRESS NOTE ADULT - PROBLEM SELECTOR PLAN 3
EKG on admission shows afib with RVR  cardiac enzyme elevation noted, likely demand ischemia  on lopressor pushes when poor oral intake  d/c verapamil home med as per cardio  will continue with lopressor in setting of CHF

## 2020-06-09 NOTE — PROGRESS NOTE ADULT - ATTENDING COMMENTS
Patient is a 89y old  Female who presents with a chief complaint of Recurrent falls noted to have low oxygen saturation by EMS (09 Jun 2020 09:03)    patient was seen and examined at bedside   less responsive then yesterday, only opens eyes    REVIEW OF SYSTEMS: not obtained     PHYSICAL EXAM:  GENERAL: NAD, lethargic, dehydrated  NERVOUS SYSTEM: arousable with tactile stimuli only, not able to follow commands   CHEST/LUNG: Right sided crepitations   HEART: Regular rate and rhythm; No murmurs, rubs, or gallops  ABDOMEN: Soft, Nontender, Nondistended; Bowel sounds present  EXTREMITIES:  2+ Peripheral Pulses, No clubbing, cyanosis, or edema    LABS:                        11.3   11.53 )-----------( 222      ( 09 Jun 2020 07:57 )             37.1     153<H>  |  117<H>  |  92<H>  ----------------------------<  140<H>  3.2<L>   |  26  |  2.09<H>    Ca    8.7      09 Jun 2020 07:57  Phos  2.6     06-09  Mg     3.0     06-09    TPro  6.4  /  Alb  3.1<L>  /  TBili  1.2  /  DBili  x   /  AST  262<H>  /  ALT  388<H>  /  AlkPhos  106  06-09    Assessment and plan:  1. Acute metabolic encephalopathy due to CAP, and worsening of vascular dementia  2. Acute hypoxic respiratory failure due to volume overload from acute on chronic systolic and diastolic CHF and CAP  3. TAWANNA  4. Transaminitis   5. Atrial fibrillation with RVR   6. Moderate MR and moderate AS   7. Recurrent Falls  8. HTN     Plan:   Will increase hydration with careful monitoring for volume overload   Monitor renal function, hold losartan, Metoprolol 2.5mg q6hrs IVP for BP and HR   Cont abx coverage with vanc and cefepime, Vanc trough subtherapeutic, will increase dose   Daughter visited patient today, patient only opended eyes, daughter tried to feed her but was not able to. Discussed about worsening mental status and poor prognosis  At this point patient is eligible for hospice care if does not significantly improve with IV hydration (unable to hydrate aggressively with poor cardiac function); daughter is aware of the decline. Will discuss with her sister.   Dysphagia diet, aspiration precaution  Palliative care consult appreciated   DNR/ DNI

## 2020-06-10 NOTE — PROGRESS NOTE ADULT - SUBJECTIVE AND OBJECTIVE BOX
PGY1 Note discussed with Supervising Resident and Primary Attending.    Patient is a 89y old  Female who presents with a chief complaint of Recurrent falls noted to have low oxygen saturation by EMS (10 Harshil 2020 12:32)      INTERVAL HPI/OVERNIGHT EVENTS :  No acute event overnight  Pt seen, examined at bedside  Pt tachy, low bp  Pt overall deteriorating  Palliative on board      ***********************************************************************************************************    MEDICATIONS  (STANDING):    MEDICATIONS  (PRN):  acetaminophen  Suppository .. 650 milliGRAM(s) Rectal every 6 hours PRN Mild Pain (1 - 3), Moderate Pain (4 - 6), Severe Pain (7 - 10)  glycopyrrolate Injectable 0.2 milliGRAM(s) IV Push every 6 hours PRN secretions  HYDROmorphone  Injectable 0.5 milliGRAM(s) IV Push every 3 hours PRN dyspnea  LORazepam   Injectable 1 milliGRAM(s) IV Push every 4 hours PRN Agitation  metoprolol tartrate Injectable 5 milliGRAM(s) IV Push every 6 hours PRN HR greater than 120      ***********************************************************************************************************    Allergies    No Known Allergies    Intolerances        *******************************************************************************************************    ***********************************************************************************************************    Vital Signs Last 24 Hrs  T(C): 36.1 (10 Harshil 2020 00:52), Max: 36.3 (09 Jun 2020 15:51)  T(F): 97 (10 Harshil 2020 00:52), Max: 97.4 (09 Jun 2020 15:51)  HR: 122 (10 Harshil 2020 05:25) (114 - 122)  BP: 153/90 (10 Harshil 2020 05:25) (130/55 - 153/90)  BP(mean): --  RR: 18 (10 Harshil 2020 00:52) (18 - 18)  SpO2: 96% (10 Harshil 2020 00:52) (96% - 96%)    ***********************************************************************************************************    PHYSICAL EXAM :  * GENERAL                 :cachectic  * HEAD                       :  Atraumatic, Normocephalic  * EYES                         :  Conjunctiva and Sclera clear  * ENT                           : Moist Mucous Membranes  * CHEST/LUNG           :  some Rales,   * HEART                       : s1,s2 No murmurs,   * ABDOMEN                : Soft, Non-distended; Bowel Sounds present  * NERVOUS SYSTEM  :  Alert & Oriented X0-1,   * EXTREMITIES            :  2+ Peripheral Pulses, +edema   * SKIN                           : No Rashes  **********************************************************************************************************  LABS:                          11.5   14.94 )-----------( 228      ( 10 Harshil 2020 06:53 )             38.0     06-10    151<H>  |  117<H>  |  82<H>  ----------------------------<  127<H>  3.5   |  23  |  2.18<H>    Ca    9.1      10 Harshil 2020 06:53  Phos  2.8     06-10  Mg     2.9     06-10    TPro  6.7  /  Alb  3.1<L>  /  TBili  1.3<H>  /  DBili  x   /  AST  190<H>  /  ALT  350<H>  /  AlkPhos  100  06-10        CAPILLARY BLOOD GLUCOSE      POCT Blood Glucose.: 160 mg/dL (10 Harshil 2020 11:26)  POCT Blood Glucose.: 128 mg/dL (10 Harshil 2020 06:54)  POCT Blood Glucose.: 136 mg/dL (09 Jun 2020 21:15)  POCT Blood Glucose.: 148 mg/dL (09 Jun 2020 16:59)      **********************************************************************************************************    RADIOLOGY & ADDITIONAL TESTS:   No radiological imaging was required    Imaging Personally Reviewed   :  [ x] YES  [ ] NO    Consultant(s) Notes Reviewed :  [x ] YES  [ ] NO

## 2020-06-10 NOTE — PROGRESS NOTE ADULT - PROBLEM SELECTOR PLAN 2
Scr on admission 1.49 ; today 2.18. Noted with persistent hypernatremia NA+ 151.  Receiving gentle IVF.      Avoid NSAIDs. Scr on admission 1.49 ; today 2.18. Noted with persistent hypernatremia NA+ 151.  Supportive care only. No further IVF, on comfort measures    Avoid NSAIDs.

## 2020-06-10 NOTE — PROGRESS NOTE ADULT - PROBLEM SELECTOR PLAN 1
covid negx1 6/4  pr BNp was elevated, troponin trending up, b/l infiltrates on cxr  - decompensated HF vs pneumonia   - on fluids   Was initially started on lasix,then fluids ,kidney function deteriorating  Echo showed G2DD with EF 30-35%  -Family understands and OPTED FOR HOSPICE

## 2020-06-10 NOTE — PROGRESS NOTE ADULT - ATTENDING COMMENTS
Patient is a 89y old  Female who presents with a chief complaint of Recurrent falls noted to have low oxygen saturation by EMS (10 Harshil 2020 14:59)    Patient was seen and examined at bedside   Moaning, opens eyes spontaneously, looks in distress     REVIEW OF SYSTEMS: not obtained     MEDICATIONS  (STANDING):    MEDICATIONS  (PRN):  acetaminophen  Suppository .. 650 milliGRAM(s) Rectal every 6 hours PRN Mild Pain (1 - 3), Moderate Pain (4 - 6), Severe Pain (7 - 10)  glycopyrrolate Injectable 0.2 milliGRAM(s) IV Push every 6 hours PRN secretions  HYDROmorphone  Injectable 0.5 milliGRAM(s) IV Push every 3 hours PRN dyspnea  LORazepam   Injectable 1 milliGRAM(s) IV Push every 4 hours PRN Agitation  metoprolol tartrate Injectable 5 milliGRAM(s) IV Push every 6 hours PRN HR greater than 120      PHYSICAL EXAM:  GENERAL: in distress   NERVOUS SYSTEM:  spontaneously opens eyes   CHEST/LUNG: BL crepitations   HEART: Regular rate and rhythm; No murmurs, rubs, or gallops  ABDOMEN: Soft, Nontender, Nondistended; Bowel sounds present  EXTREMITIES:  2+ Peripheral Pulses, No clubbing, cyanosis, or edema  SKIN: multiple bruises and lacerations   LABS:                        11.5   14.94 )-----------( 228      ( 10 Harshil 2020 06:53 )             38.0     151<H>  |  117<H>  |  82<H>  ----------------------------<  127<H>  3.5   |  23  |  2.18<H>    Assessment and plan:   1. Palliative care   2. Acute metabolic encephalopathy due to CAP, and worsening of vascular dementia  3. Acute hypoxic respiratory failure due to volume overload from acute on chronic systolic and diastolic CHF and CAP  4. TAWANNA  5. Transaminitis   6. Atrial fibrillation with RVR   7. Moderate MR and moderate AS   8. Recurrent Falls  9. HTN     Plan:   Clinically patient is worsening, with worsening mental status   DC abx, IVF and labs   Discussed with daughter about worsening mental status and poor prognosis  At this point patient is eligible for hospice care  Dysphagia diet, aspiration precaution  Palliative care consult appreciated   Comfort care

## 2020-06-10 NOTE — PROGRESS NOTE ADULT - ATTENDING COMMENTS
Patient continues to decline clinically, prognosis likely hours to days. Family wishes to focus on comfort measures at this time. DNR/DNI. No further labs or IVF. No inpatient hospice beds currently available, continue end of life care on medical floor.

## 2020-06-10 NOTE — PROGRESS NOTE ADULT - PROBLEM SELECTOR PLAN 1
with sepsis, Likely  pneumonia. Tachypneic on RA.  Receiving antibiotics.    CRX Bilateral symmetric infiltrates. COVID 19 neg. Echo showed EF 30-35%, mod MR, mod AS.     -Dilaudid PRN for respiratory distress  -Ativan PRN for anxiety Glycopyrrolate PRN for secretions  -Bowel regimen  Pt is DNR/DNI  Family prefers to focus on comfort measures.  Per daughter Tammi, no further IVF, or Labs.

## 2020-06-10 NOTE — PROGRESS NOTE ADULT - SUBJECTIVE AND OBJECTIVE BOX
OVERNIGHT EVENTS: Pt is AOx0-1. Tachypneic and tachycardic.     Present Symptoms:   Unable to obtain due to poor mentation    MEDICATIONS  (STANDING):  cefepime   IVPB      cefepime   IVPB 1000 milliGRAM(s) IV Intermittent every 24 hours  dextrose 5% + sodium chloride 0.45% 1000 milliLiter(s) (50 mL/Hr) IV Continuous <Continuous>  heparin   Injectable 5000 Unit(s) SubCutaneous every 8 hours  metoprolol tartrate 25 milliGRAM(s) Oral two times a day  tamsulosin 0.4 milliGRAM(s) Oral at bedtime  vancomycin  IVPB 1000 milliGRAM(s) IV Intermittent every 24 hours    MEDICATIONS  (PRN):  acetaminophen  Suppository .. 650 milliGRAM(s) Rectal every 6 hours PRN Mild Pain (1 - 3), Moderate Pain (4 - 6), Severe Pain (7 - 10)  metoprolol tartrate Injectable 5 milliGRAM(s) IV Push every 6 hours PRN HR greater than 120      PHYSICAL EXAM:  Vital Signs Last 24 Hrs  T(C): 36.1 (10 Harshil 2020 00:52), Max: 36.3 (09 Jun 2020 15:51)  T(F): 97 (10 Harshil 2020 00:52), Max: 97.4 (09 Jun 2020 15:51)  HR: 122 (10 Harshil 2020 05:25) (114 - 122)  BP: 153/90 (10 Harshil 2020 05:25) (130/55 - 153/90)  BP(mean): --  RR: 18 (10 Harshil 2020 00:52) (18 - 18)  SpO2: 96% (10 Harshil 2020 00:52) (96% - 96%)  General: Cachectic, temporal wasting, left frontal bruise, NAD  Karnofsky Performance Score/Palliative Performance Status Version2: 20 %    HEENT: Abrasion to forehead and nose  Lungs: tachypneic on RA   CV: S1S2, RRR, tachycardia  GI: soft, non tender on palpation  :randall  Musculoskeletal: no edema, bedbound  Skin: fragile, abrasion to right hand  Neuro: AOx1, unable to follow commands   Oral intake ability: unable (2/2 mentation)  Diet: dysphagia      LABS:                          11.5   14.94 )-----------( 228      ( 10 Harshil 2020 06:53 )             38.0     06-10    151<H>  |  117<H>  |  82<H>  ----------------------------<  127<H>  3.5   |  23  |  2.18<H>    Ca    9.1      10 Harshil 2020 06:53  Phos  2.8     06-10  Mg     2.9     06-10    TPro  6.7  /  Alb  3.1<L>  /  TBili  1.3<H>  /  DBili  x   /  AST  190<H>  /  ALT  350<H>  /  AlkPhos  100  06-10        RADIOLOGY & ADDITIONAL STUDIES: Reviewed    ADVANCE DIRECTIVES: DNR/DNI/No feeding tube/Comfort measures only

## 2020-06-10 NOTE — CHART NOTE - NSCHARTNOTEFT_GEN_A_CORE
Sw PC  participated in a discussion with the patients' daughter Tammi and PC NP Ricky Ibarra regarding goals of care.  Ms. Boateng stated she visited  her mother at [facility] and recognizes her condition has declined.  Therefore, Ms. Boateng stated she would like the patient to be kept comfortable.  Minimizing suffering is what is most important at this time.  Therefore, the advance directives are as follows: DNR/DNI/no labs/ no IV fluids/no peg and no trial of artifical nutrition.  Patient's End of Life care will be provided on the medical floor as there are no hospice beds at this time.  Emotional support was provided, no addl. needs were expressed.  PC Sw will remain available as needed.

## 2020-06-10 NOTE — PROGRESS NOTE ADULT - PROBLEM SELECTOR PLAN 3
Acute encephalopathy likely 2/2 sepsis,  superimposed on some degree of underlying dementia,  Daughter reports pt is forgetful, s/p mechanical falls 3x this week. CT head showed scalp hematoma, no acute intracranial findings.

## 2020-06-11 NOTE — PROGRESS NOTE ADULT - PROBLEM SELECTOR PROBLEM 3
Dementia
Dementia
Atrial fibrillation with rapid ventricular response
Atrial fibrillation with rapid ventricular response
TAWANNA (acute kidney injury)
Atrial fibrillation with rapid ventricular response

## 2020-06-11 NOTE — PROGRESS NOTE ADULT - PROBLEM SELECTOR PLAN 6
Patient daughter Tammi expressed after seeing her mother last evening she recognizes the progressive decline and prefers to focus on comfort at this time.    DNR/DNI on file.  No further labs, or IVF.  She does not want artificial feeds/PEG.  All questions answered.  Support provided.
Updated pt's daughter Tammi as to patient's progressive decline.  She stated receiving conflicting information regarding pt's clinical condition and  would like to visit with the patient before making any further decisions around goals of care.  Dr. Dias will reach out to her and schedule visit.   Support provided. Palliative care will follow.
DNR/DNI  MOLST form in chart
likely worsened by moderate respiratory distress  was taking losartan and hTCZ at home  will restart losartan 50mg qd once creatinine stabilizes as per cardio  monitor vitals
likely worsened by moderate respiratory distress  was taking losartan and hTCZ at home, POOR PROGNOSIS  -fAMILY OPTED FOR HOSPICE  monitor vitals
likely worsened by moderate respiratory distress  -was taking losartan and hTCZ at home, POOR PROGNOSIS  -fAMILY OPTED FOR HOSPICE  monitor vitals

## 2020-06-11 NOTE — PROGRESS NOTE ADULT - PROBLEM SELECTOR PROBLEM 1
Acute hypoxemic respiratory failure

## 2020-06-11 NOTE — PROGRESS NOTE ADULT - PROBLEM SELECTOR PLAN 7
DNR/DNI  MOLST form in chart
DNR/DNI  MOLST form in chart  -fAMILY OPTED FOR HOSPICE
IMPROVE VTE Individual Risk Assessment          RISK                                                          Points  [  ] Previous VTE                                                3  [  ] Thrombophilia                                             2  [  ] Lower limb paralysis                                   2        (unable to hold up >15 seconds)    [  ] Current Cancer                                             2         (within 6 months)  [ X ] Immobilization > 24 hrs                              1  [  ] ICU/CCU stay > 24 hours                             1  [ X ] Age > 60                                                         1    IMPROVE VTE Score: 2    subq heparin for dvt ppx
DNR/DNI  MOLST form in chart  -fAMILY OPTED FOR HOSPICE

## 2020-06-11 NOTE — PROGRESS NOTE ADULT - PROBLEM SELECTOR PROBLEM 4
Severe protein-calorie malnutrition
Severe protein-calorie malnutrition
Fall
TAWANNA (acute kidney injury)

## 2020-06-11 NOTE — PROGRESS NOTE ADULT - PROBLEM SELECTOR PLAN 1
covid negx1 6/4  - decompensated HF vs pneumonia   Echo showed G2DD with EF 30-35%  -Family understands and OPTED FOR HOSPICE

## 2020-06-11 NOTE — PROGRESS NOTE ADULT - PROBLEM SELECTOR PLAN 4
2/2  comorbidities.  Daughter reports good appetite prior to admission. Diet as tolerated.   Discussed comfort feeds.  She does not want artificial nutrition or feeding tubes, prefers to allow for comfort feeds as tolerated. Currently on dysphagia diet.  Nutrition ifrah noted.
2/2  comorbidities.  Daughter reports good appetite prior to admission. Diet as tolerated.  Currently unable to eat.  Nutrition eval noted
CR trending up  -fAMILY OPTED FOR HOSPICE
presents s/p mechanical fall per daughter  Ct head negative for acute pathology  fall precautions  PT consulted
presents s/p mechanical fall per daughter  Ct head negative for acute pathology  fall precautions  PT consulted
presents s/p mechanical fall per daughter  f/u CT head to r/o overt intracranial pathology 2/2 to fall  fall precautions  PT consulted
trending up  -hold off lasix  patient looks dehydrated, will continue with fluids  Has hypernatremia   -d5 half NS  f/u bmp daily
CR trending up  -fAMILY OPTED FOR HOSPICE

## 2020-06-11 NOTE — PROGRESS NOTE ADULT - PROBLEM SELECTOR PLAN 5
Per daughter pt has been independent up until this past week noted to unsteady s/p 3 falls.  With Soft tissue swelling/subcutaneous hematoma    PT recommending BROOK.
Per daughter pt has been independent up until this past week noted to unsteady s/p 3 falls.  With Soft tissue swelling/subcutaneous hematoma.  High risk for skin failure. Supportive care.  Frequent positioning.     PT recommending BROOK.
likely worsened by moderate respiratory distress  was taking losartan and hTCZ at home  will restart losartan  lopressor IV push q6 with parameters  monitor vitals
likely worsened by moderate respiratory distress  was taking losartan and hTCZ at home  will restart losartan  monitor vitals
likely worsened by moderate respiratory distress  was taking losartan and hTCZ at home  will restart losartan once creatinine stabilizes  monitor vitals
presents s/p mechanical fall per daughter  Ct head negative for acute hemorrhage  -fAMILY OPTED FOR HOSPICE  PT suggested BROOK
presents s/p mechanical fall per daughter  Ct head negative for acute hemorrhage  fall precautions  PT suggested BROOK
presents s/p mechanical fall per daughter  Ct head negative for acute hemorrhage  -fAMILY OPTED FOR HOSPICE  PT suggested BROOK

## 2020-06-11 NOTE — PROGRESS NOTE ADULT - ASSESSMENT
1. Acute hypoxic respiratory failure due to volume overload from acute on chronic systolic and diastolic CHF   2. CAP   3. TAWANNA, metabolic acidosis improved  4. lactic acidosis improved   5. Atrial fibrillation with RVR   6. Moderate MR and moderate AS   7. Recurrent Falls  8. HTN     Plan:   Patient still looks dehydrated, will cont gentle hydration  Downtrending CK and normal LA  Monitor renal function, hold losartan, Metoprolol 2.5mg q6hrs IVP for BP and HR   Cont abx coverage to vanc and cefepime   PT eval BROOK  Swallow eval   Palliative care consult appreciated   DNR/ DNI
1. Acute hypoxic respiratory failure due to volume overload from acute on chronic systolic and diastolic CHF   2. CAP   3. TAWANNA, metabolic acidosis improved  4. lactic acidosis improved   5. Atrial fibrillation with RVR   6. Moderate MR and moderate AS   7. Recurrent Falls  8. HTN     Plan:   Will cont gentle hydration  No labs drawn today  Monitor renal function, hold losartan, Metoprolol 2.5mg q6hrs IVP for BP and HR   Cont abx coverage with vanc and cefepime, Vanc trough before 4th dose   DC Chang and TOV tomorrow  Titrate down supplemental oxygen   PT eval BROOK  Dysphagia diet   Palliative care consult appreciated   DNR/ DNI
89 y/o female admitted to telemetry for acute hypoxic respiratory failure possibly 2/2 to acute heart failure vs COVID19 pneumonia. Patient clinically appears volume overloaded on examination and EKG shows new onset afib.
89 y/o female admitted to telemetry for acute hypoxic respiratory failure possibly 2/2 to acute heart failure vs COVID19 pneumonia. Patient clinically appears volume overloaded on examination and EKG shows new onset afib.    FAMILY OPTED FOR HOSPICE
89 yo F with HTN who presented with new-onset A fib with RVR and acute, decompensated congestive heart failure given fluid overload on exam, radiologic imaging, and elevated pro-BNP values.     1. Acute, decompensated systolic HF exacerbation:   -Strict Is and Os, daily weights  -Concern for overdiuresis, furosemide was held  -Possible contributor to LV dysfunction is A fib, however ischemia will need to be ruled out given segmental wall motion abnormalities noted on echo. Recommend stress test as outpatient (patient currently with infection on IV antibiotics), if compatible with goals of care.  -Continue with beta blocker  -Losartan when creatinine improves to baseline  -Holding off spironolactone due to TAWANNA    2. Atrial fibrillation: CHADS2-VASc score is 4 (HTN, age +2, female), consistent with >4% annual risk of stroke if off systemic anticoagulation. Normally would recommend systemic anticoagulation, however patient had multiple recent falls so would hold off until ambulatory status is clarified. Furthermore, patient needs CT head to ensure there is no ICH.   -Rate control with metoprolol, target HR < 110 bpm on average      3. HTN: Patient is on losartan-HCTZ and verapamil at home.   -Agree with metoprolol for rate control (currently in sinus rhtyhm), would not give verapamil in setting of heart failure and decreased EF  -Would add on losartan 50mg PO daily when creatinine improves
89 y/o female admitted to telemetry for acute hypoxic respiratory failure possibly 2/2 to acute heart failure vs COVID19 pneumonia. Patient clinically appears volume overloaded on examination and EKG shows new onset afib.
89 y/o female admitted to telemetry for acute hypoxic respiratory failure possibly 2/2 to acute heart failure vs COVID19 pneumonia. Patient clinically appears volume overloaded on examination and EKG shows new onset afib.    FAMILY OPTED FOR HOSPICE

## 2020-06-11 NOTE — PROGRESS NOTE ADULT - PROBLEM SELECTOR PROBLEM 6
Palliative care encounter
Palliative care encounter
Advance care planning
Hypertensive urgency

## 2020-06-11 NOTE — PROGRESS NOTE ADULT - ATTENDING COMMENTS
Patient is a 89y old  Female who presents with a chief complaint of Recurrent falls noted to have low oxygen saturation by EMS (11 Jun 2020 09:39)    Patient was seen and examined at bedside  Comfortable    INTERVAL HPI/OVERNIGHT EVENTS:  T(C): 36.6 (06-11-20 @ 07:53), Max: 36.7 (06-11-20 @ 00:24)  HR: 117 (06-11-20 @ 07:53) (93 - 123)  BP: 104/66 (06-11-20 @ 07:53) (104/66 - 164/85)  RR: 24 (06-11-20 @ 07:53) (22 - 24)  SpO2: 95% (06-11-20 @ 07:53) (86% - 95%)    REVIEW OF SYSTEMS: not obtained     PHYSICAL EXAM:  GENERAL: NAD  NERVOUS SYSTEM:  non verbal, only opens eyes with calling by name   CHEST/LUNG: BL crepitations  HEART: Regular rate and rhythm; No murmurs, rubs, or gallops  ABDOMEN: Soft, Nontender, Nondistended; Bowel sounds present  EXTREMITIES:  2+ Peripheral Pulses, No clubbing, cyanosis, or edema  SKIN: multiple bruise and skin lacerations     LABS:                      11.5   14.94 )-----------( 228      ( 10 Harshil 2020 06:53 )             38.0     151<H>  |  117<H>  |  82<H>  ----------------------------<  127<H>  3.5   |  23  |  2.18<H>    Assessment and plan:  1. Palliative care   2. Acute metabolic encephalopathy due to CAP, and worsening of vascular dementia  3. Acute hypoxic respiratory failure due to volume overload from acute on chronic systolic and diastolic CHF and CAP  4. TAWANNA  5. Transaminitis   6. Atrial fibrillation with RVR   7. Moderate MR and moderate AS   8. Recurrent Falls  9. HTN     Plan:   Clinically patient is worsening, with worsening mental status   DC abx, IVF and labs   Discussed with daughter about worsening mental status and poor prognosis  At this point patient is eligible for hospice care  Dysphagia diet, aspiration precaution  Palliative care consult appreciated   Comfort care

## 2020-06-11 NOTE — PROGRESS NOTE ADULT - SUBJECTIVE AND OBJECTIVE BOX
PGY1 Note discussed with Supervising Resident and Primary Attending.    Patient is a 89y old  Female who presents with a chief complaint of Recurrent falls noted to have low oxygen saturation by EMS (10 Harshil 2020 14:59)      INTERVAL HPI/OVERNIGHT EVENTS :    ***********************************************************************************************************    MEDICATIONS  (STANDING):    MEDICATIONS  (PRN):  acetaminophen  Suppository .. 650 milliGRAM(s) Rectal every 6 hours PRN Mild Pain (1 - 3), Moderate Pain (4 - 6), Severe Pain (7 - 10)  glycopyrrolate Injectable 0.2 milliGRAM(s) IV Push every 6 hours PRN secretions  HYDROmorphone  Injectable 0.5 milliGRAM(s) IV Push every 3 hours PRN dyspnea  LORazepam   Injectable 1 milliGRAM(s) IV Push every 4 hours PRN Agitation  metoprolol tartrate Injectable 5 milliGRAM(s) IV Push every 6 hours PRN HR greater than 120      ***********************************************************************************************************    Allergies    No Known Allergies    Intolerances        ***********************************************************************************************************    REVIEW OF SYSTEMS :  * CONSTITUTIONAL      : No Fever, Weight loss, or Fatigue  * EYES                             : No eye pain , Visual disturbances or Discharge  * RESPIRATORY             : No Cough, Wheezing, Chills or Hemoptysis; No shortness of breath  * CARDIOVASCULAR     : No Chest pain, Palpitations, Dizziness, or Leg swelling  * GASTROINTESTINAL  : No Abdominal or Epigastric pain. No Nausea, Vomiting or Hematemesis; No Diarrhea or Constipation. No Melena or Hematochezia.  * GENITOURINARY        : No Dysuria , Frequency , Haematuria   * NEUROLOGICAL          : No Headaches, Memory loss, Loss of strength, Numbness, or Tremors  * MUSCULOSKELETAL   : No Joint pain  * PSYCHIATRY                 : No Depression or Anxiety   * HEME/LYMPH              : No Easy Bruising or Bleeding gums  * SKIN                               : No Itching, Burning, Rashes, or Lesions     ***********************************************************************************************************    Vital Signs Last 24 Hrs  T(C): 36.6 (11 Jun 2020 07:53), Max: 36.7 (11 Jun 2020 00:24)  T(F): 97.9 (11 Jun 2020 07:53), Max: 98 (11 Jun 2020 00:24)  HR: 117 (11 Jun 2020 07:53) (93 - 123)  BP: 104/66 (11 Jun 2020 07:53) (104/66 - 164/85)  BP(mean): --  RR: 24 (11 Jun 2020 07:53) (22 - 24)  SpO2: 95% (11 Jun 2020 07:53) (86% - 95%)    ***********************************************************************************************************    PHYSICAL EXAM :  * GENERAL                 : NAD, Well-groomed, Well-developed  * HEAD                       :  Atraumatic, Normocephalic  * EYES                         : EOMI, PERRLA, Conjunctiva and Sclera clear  * ENT                           : Moist Mucous Membranes  * NECK                         : Supple, No JVD, Normal Thyroid  * CHEST/LUNG           : Clear to Auscultation bilaterally; No Rales, Rhonchi, Wheezing or Rubs  * HEART                       : Regular Rate and Rhythm; No murmurs, Rubs or gallops  * ABDOMEN                : Soft, Non-tender, Non-distended; Bowel Sounds present  * NERVOUS SYSTEM  :  Alert & Oriented X3, Good Concentration; Motor Strength 5/5 B/L UL LL ; DTRs 2+ Intact and Symmetric  * EXTREMITIES            :  2+ Peripheral Pulses, No clubbing, cyanosis, or edema  * SKIN                           : No Rashes or Lesions    **********************************************************************************************************  LABS:                          11.5   14.94 )-----------( 228      ( 10 Harshil 2020 06:53 )             38.0     06-10    151<H>  |  117<H>  |  82<H>  ----------------------------<  127<H>  3.5   |  23  |  2.18<H>    Ca    9.1      10 Harshil 2020 06:53  Phos  2.8     06-10  Mg     2.9     06-10    TPro  6.7  /  Alb  3.1<L>  /  TBili  1.3<H>  /  DBili  x   /  AST  190<H>  /  ALT  350<H>  /  AlkPhos  100  06-10        CAPILLARY BLOOD GLUCOSE      POCT Blood Glucose.: 93 mg/dL (10 Harshil 2020 17:06)  POCT Blood Glucose.: 160 mg/dL (10 Harshil 2020 11:26)      **********************************************************************************************************    RADIOLOGY & ADDITIONAL TESTS:   No radiological imaging was required    Imaging Personally Reviewed   :  [ ] YES  [ ] NO    Consultant(s) Notes Reviewed :  [ ] YES  [ ] NO PGY1 Note discussed with Supervising Resident and Primary Attending.    Patient is a 89y old  Female who presents with a chief complaint of Recurrent falls noted to have low oxygen saturation by EMS (10 Harshil 2020 14:59)      INTERVAL HPI/OVERNIGHT EVENTS :  Pt  seen and examined at bedside  Pt comfortable , in no acute distress  pt is a candidate for inpatient hospice.  comfort care for now      ***********************************************************************************************************    MEDICATIONS  (STANDING):    MEDICATIONS  (PRN):  acetaminophen  Suppository .. 650 milliGRAM(s) Rectal every 6 hours PRN Mild Pain (1 - 3), Moderate Pain (4 - 6), Severe Pain (7 - 10)  glycopyrrolate Injectable 0.2 milliGRAM(s) IV Push every 6 hours PRN secretions  HYDROmorphone  Injectable 0.5 milliGRAM(s) IV Push every 3 hours PRN dyspnea  LORazepam   Injectable 1 milliGRAM(s) IV Push every 4 hours PRN Agitation  metoprolol tartrate Injectable 5 milliGRAM(s) IV Push every 6 hours PRN HR greater than 120      ***********************************************************************************************************    Allergies    No Known Allergies    Intolerances        ************************************************************************************************************    Vital Signs Last 24 Hrs  T(C): 36.6 (11 Jun 2020 07:53), Max: 36.7 (11 Jun 2020 00:24)  T(F): 97.9 (11 Jun 2020 07:53), Max: 98 (11 Jun 2020 00:24)  HR: 117 (11 Jun 2020 07:53) (93 - 123)  BP: 104/66 (11 Jun 2020 07:53) (104/66 - 164/85)  BP(mean): --  RR: 24 (11 Jun 2020 07:53) (22 - 24)  SpO2: 95% (11 Jun 2020 07:53) (86% - 95%)    ***********************************************************************************************************    PHYSICAL EXAM :  * GENERAL                 : NAD, appears comfortable  * HEAD                       :  Atraumatic, Normocephalic  * EYES                         : Conjunctiva and Sclera clear  * ENT                           : Moist Mucous Membranes  * NECK                         : Supple,   * CHEST/LUNG           :some Rales,  * HEART                       : s1s2+  * ABDOMEN                : Soft, Non-distended; Bowel Sounds present  * SKIN                           : No Rashes , some edema present in lower extremity    **********************************************************************************************************  LABS:                          11.5   14.94 )-----------( 228      ( 10 Harshil 2020 06:53 )             38.0     06-10    151<H>  |  117<H>  |  82<H>  ----------------------------<  127<H>  3.5   |  23  |  2.18<H>    Ca    9.1      10 Harshil 2020 06:53  Phos  2.8     06-10  Mg     2.9     06-10    TPro  6.7  /  Alb  3.1<L>  /  TBili  1.3<H>  /  DBili  x   /  AST  190<H>  /  ALT  350<H>  /  AlkPhos  100  06-10        CAPILLARY BLOOD GLUCOSE      POCT Blood Glucose.: 93 mg/dL (10 Harshil 2020 17:06)  POCT Blood Glucose.: 160 mg/dL (10 Harshil 2020 11:26)      **********************************************************************************************************    RADIOLOGY & ADDITIONAL TESTS:   No radiological imaging was required    Imaging Personally Reviewed   :  [ x] YES  [ ] NO    Consultant(s) Notes Reviewed :  [ x] YES  [ ] NO

## 2020-06-11 NOTE — PROGRESS NOTE ADULT - PROBLEM SELECTOR PROBLEM 7
Advance care planning
Advance care planning
Need for prophylactic measure
Advance care planning

## 2020-06-11 NOTE — PROGRESS NOTE ADULT - PROBLEM SELECTOR PROBLEM 2
TAWANNA (acute kidney injury)
TAWANNA (acute kidney injury)
Atrial fibrillation with rapid ventricular response
Community acquired pneumonia

## 2020-06-11 NOTE — PROGRESS NOTE ADULT - REASON FOR ADMISSION
Recurrent falls noted to have low oxygen saturation by EMS

## 2020-06-12 PROBLEM — Z78.9 OTHER SPECIFIED HEALTH STATUS: Chronic | Status: ACTIVE | Noted: 2020-01-01

## 2020-06-12 PROBLEM — I10 ESSENTIAL (PRIMARY) HYPERTENSION: Chronic | Status: ACTIVE | Noted: 2020-01-01

## 2020-06-12 NOTE — DISCHARGE NOTE PROVIDER - NSDCPNSUBOBJ_GEN_ALL_CORE
Patient is a 89y old  Female who presents with a chief complaint of Recurrent falls noted to have low oxygen saturation by EMS (12 Jun 2020 14:28)        Patient was seen and examined at bedside    Comfortable, dehydrated, moans when wakes up          INTERVAL HPI/OVERNIGHT EVENTS:    T(C): 36.6 (06-12-20 @ 08:11), Max: 36.6 (06-12-20 @ 00:21)    HR: 117 (06-12-20 @ 08:11) (104 - 119)    BP: 121/81 (06-12-20 @ 08:11) (121/81 - 134/62)    RR: 22 (06-12-20 @ 08:11) (20 - 22)    SpO2: 97% (06-12-20 @ 08:11) (94% - 99%)    Wt(kg): --    I&O's Summary        11 Jun 2020 07:01  -  12 Jun 2020 07:00    --------------------------------------------------------    IN: 0 mL / OUT: 280 mL / NET: -280 mL                REVIEW OF SYSTEMS: not obtained         MEDICATIONS  (STANDING):        MEDICATIONS  (PRN):    acetaminophen  Suppository .. 650 milliGRAM(s) Rectal every 6 hours PRN Mild Pain (1 - 3), Moderate Pain (4 - 6), Severe Pain (7 - 10)    glycopyrrolate Injectable 0.2 milliGRAM(s) IV Push every 6 hours PRN secretions    HYDROmorphone  Injectable 0.5 milliGRAM(s) IV Push every 3 hours PRN dyspnea    LORazepam   Injectable 1 milliGRAM(s) IV Push every 4 hours PRN Agitation    metoprolol tartrate Injectable 5 milliGRAM(s) IV Push every 6 hours PRN HR greater than 120            PHYSICAL EXAM:    GENERAL: dehydrated     NERVOUS SYSTEM:  non verbal, bed bound     CHEST/LUNG: BL crepitations     HEART: Regular rate and rhythm; No murmurs, rubs, or gallops    ABDOMEN: Soft, Nontender, Nondistended; Bowel sounds present    EXTREMITIES:  2+ Peripheral Pulses, No clubbing, cyanosis, or edema    SKIN: bruise or skin break         Assessment and plan:    1. Palliative care     2. Acute metabolic encephalopathy due to CAP, and worsening of vascular dementia    3. Acute hypoxic respiratory failure due to volume overload from acute on chronic systolic and diastolic CHF and CAP    4. TAWANNA    5. Transaminitis     6. Atrial fibrillation with RVR     7. Moderate MR and moderate AS     8. Recurrent Falls    9. HTN         Plan:     Dilaudid, Ativan for comfort care    DC abx, IVF and labs     Discussed with daughter about worsening mental status and poor prognosis    Patient is eligible for hospice care

## 2020-06-12 NOTE — GOALS OF CARE CONVERSATION - ADVANCED CARE PLANNING - STAFF/SOCIAL WORKER
LEONOR Jansen, [facility]
LEONOR Jansen, \[facility]\""W
LEONOR Jansen, \[facility]\""W

## 2020-06-12 NOTE — GOALS OF CARE CONVERSATION - ADVANCED CARE PLANNING - TREATMENT GUIDELINE COMMENT
Continue current medical management, however DNR/DNI.

## 2020-06-12 NOTE — DISCHARGE NOTE PROVIDER - NSDCCPCAREPLAN_GEN_ALL_CORE_FT
PRINCIPAL DISCHARGE DIAGNOSIS  Diagnosis: Palliative care encounter  Assessment and Plan of Treatment: Given patient's clinical status and no improvement decision was made PRINCIPAL DISCHARGE DIAGNOSIS  Diagnosis: Palliative care encounter  Assessment and Plan of Treatment: Given patient's clinical status and no improvement decision was made for inpatient hospice. Pt is comfortable and ready to be discharged.      SECONDARY DISCHARGE DIAGNOSES  Diagnosis: Dementia  Assessment and Plan of Treatment: Pt has a histroy of Dementia. Pt declined during her stay and family was update about her poor prognosis and they opted for home hospice.    Diagnosis: TAWANNA (acute kidney injury)  Assessment and Plan of Treatment: Pt came with TAWANNA (acute kidney injury) and function declined with all the treatments.  Pt declined during her stay and family was update about her poor prognosis and they opted for home hospice.    Diagnosis: Community acquired pneumonia  Assessment and Plan of Treatment: Pt was suspectd to have Community acquired pneumonia.  Pt declined during her stay and family was update about her poor prognosis and they opted for home hospice. PRINCIPAL DISCHARGE DIAGNOSIS  Diagnosis: Palliative care encounter  Assessment and Plan of Treatment: Given patient's clinical status and no improvement decision was made for inpatient hospice. Pt is comfortable and ready to be discharged to hospice care.      SECONDARY DISCHARGE DIAGNOSES  Diagnosis: Acute on chronic systolic and diastolic heart failure, NYHA class 4  Assessment and Plan of Treatment: TTE showed EF 30-35% with grade 2 Diastolic heart failure. patient was diuresed which improved the respiratory status but patient became dehydrated with worsening renal function. When IVF given patient became volume overloaded. Fluid was discontinued.    Diagnosis: TAWANNA (acute kidney injury)  Assessment and Plan of Treatment: Pt came with TAWANNA (acute kidney injury) and was treated with IVF. She developed volume overload with minimal improvement of renal function.  Pt declined during her stay and family was update about her poor prognosis and they opted for home hospice.    Diagnosis: Dementia  Assessment and Plan of Treatment: Pt has a histroy of Dementia. Pt declined during her stay and family was update about her poor prognosis and they opted for home hospice.    Diagnosis: Community acquired pneumonia  Assessment and Plan of Treatment: Pt was suspectd to have Community acquired pneumonia. Antibiotic coverage was extended. Pt declined during her stay and family was update about her poor prognosis and they opted for home hospice.

## 2020-06-12 NOTE — GOALS OF CARE CONVERSATION - ADVANCED CARE PLANNING - WHAT MATTERS MOST
Patient's daughter expressed understanding of this recent and rapid decline, however shared hope that her mother's condition may improve.   stated at this time what matters most is continuing the current regime and "giving the situation more time."

## 2020-06-12 NOTE — GOALS OF CARE CONVERSATION - ADVANCED CARE PLANNING - CONVERSATION/DISCUSSION
Palliative Care Referral/reviewed code status on file:DNR/DNI/Treatment Options/Diagnosis/Hospice Referral
Treatment Options/Diagnosis/Hospice Referral/reviewed code status on file:DNR/DNI/Palliative Care Referral
Diagnosis/Treatment Options/Palliative Care Referral/reviewed code status on file:DNR/DNI

## 2020-06-12 NOTE — GOALS OF CARE CONVERSATION - ADVANCED CARE PLANNING - FUTURE HOSPITALIZATION/TRANSFER
Send to hospital, if necessary, based on [facility] orders

## 2020-06-12 NOTE — DISCHARGE NOTE NURSING/CASE MANAGEMENT/SOCIAL WORK - PATIENT PORTAL LINK FT
You can access the FollowMyHealth Patient Portal offered by [facility] by registering at the following website: [email or web address]. By joining [facility]’s FollowMyHealth portal, you will also be able to view your health information using other applications (apps) compatible with our system.

## 2020-06-12 NOTE — DISCHARGE NOTE PROVIDER - HOSPITAL COURSE
89 y/o female from home with PMHx of HTN presents to the ED after a fall at home. History from patient is limited as patient is in moderate respiratory distress requiring nonrebreather mask for support. History aided by patient's daughter Tammi. As per daughter, patient has had three mechanical falls since Monday, with most recent fall being this morning. She denies noting any other issues or symptoms out of the ordinary. Per EMS, patient's O2 sat was in the 70s when she was found, and she was placed on NRB mask. At present, patient is in moderate respiratory distress with tachypnea. She is maintaining an O2 saturation of 97% on 100% NRB.    She was admitted for evaluation of repeated falls. She was found to have encephalopathy secondary to AHRF. She was found to have CHF but given her clininal status of dehydration she was started on IV fluids. She was found to have TAWANNA with metabolic acidosis. She was deteriorating and not improving. Decision was made by family that hospice is appropriate for the patient. She is being discharged to Hospice care. 89 y/o female from home with PMHx of HTN presents to the ED after a fall at home. History from patient is limited as patient is in moderate respiratory distress requiring nonrebreather mask for support. History aided by patient's daughter Tammi. As per daughter, patient has had three mechanical falls since Monday, with most recent fall being this morning. She denies noting any other issues or symptoms out of the ordinary. Per EMS, patient's O2 sat was in the 70s when she was found, and she was placed on NRB mask. At present, patient is in moderate respiratory distress with tachypnea. She is maintaining an O2 saturation of 97% on 100% NRB.    She was admitted for evaluation of repeated falls. She was found to have encephalopathy secondary to acute heart failure. TTE showed EF 30-35% with grade 2 diastolic dysfunction. She was started on diuretics with initial improvement of respiratory status. But her mental status worsened due to dehydration she was started on IV fluids. CT head did not show any acute change. She was found to have TAWANNA with metabolic acidosis. She was given IVF but developed volume overload. Antibiotic coverage was increased. But her mental status worsened. Decision was made by family that hospice is appropriate for the patient. She is being discharged to Hospice care.

## 2020-06-12 NOTE — DISCHARGE NOTE PROVIDER - NSDCMRMEDTOKEN_GEN_ALL_CORE_FT
losartan-hydrochlorothiazide 100mg-12.5mg oral tablet: 1 tab(s) orally once a day  verapamil 180 mg/12 hours oral tablet, extended release: 1 tab(s) orally 2 times a day acetaminophen 650 mg rectal suppository: 1 suppository(ies) rectal every 6 hours, As needed, Mild Pain (1 - 3), Moderate Pain (4 - 6), Severe Pain (7 - 10)  glycopyrrolate 0.2 mg/mL injectable solution:  injectable   HYDROmorphone:   LORazepam:

## 2020-06-12 NOTE — GOALS OF CARE CONVERSATION - ADVANCED CARE PLANNING - CONVERSATION DETAILS
Consents signed. Patient will be admitted to hospice inpatient  services.
TC to daughter Tammi who is receptive to hospice services. She is unable to print hospice consents. She is willing to have her sister pick consents up at the  in order for her to sign.  Message left for Alejandra EDWARDS.
PC  and PC NP contacted patient's daughter Tammi Boateng who identies herself as the primary medical decision maker for her mother.  Patient has three daughters, however she resides with Tammi.  PC NP provided a clinical summary which seemed shocking to the patient's daughter.   stated the patient's condition seems to have declined rapidly, therefore she would prefer to allow more time with hope of improvement.  Should the patient's condition not improve the family would likely transition to comfort care.  DNR/DNI are on file.   stated she will discuss her mothers' condition with her two sisters as well.  Emotional support was provided as well as a brief overview of hospice services.  Patient does not have medicaid, therefore PC Sw suggested private hire to assist with care, if needed.  Mrs. Boateng is a , L[employer] and according to her daughter enjoyed family and assisting with small chores in the home.  Both Sw and NP acknowledged the difficulty of this discussion and stated they will remain available.  Contact information was provided.

## 2020-06-13 NOTE — H&P ADULT - NSHPPHYSICALEXAM_GEN_ALL_CORE
barely arouseable but gets restless and agitated when aroused.   cachectic and elderly appearing  extensive bruising/ecchymosis on left side of face  no respiratory distress at this time  HR RRR s1s2  abd soft NTND  randall in place  contracted extremities  skin with poor turgor  Vital Signs Last 24 Hrs  T(C): 36.4 (13 Jun 2020 07:55), Max: 37.3 (12 Jun 2020 16:19)  T(F): 97.6 (13 Jun 2020 07:55), Max: 99.2 (12 Jun 2020 16:19)  HR: 124 (13 Jun 2020 07:55) (109 - 127)  BP: 128/60 (13 Jun 2020 07:55) (109/55 - 162/88)  BP(mean): --  RR: 16 (13 Jun 2020 07:55) (16 - 22)  SpO2: 100% (13 Jun 2020 07:55) (95% - 100%)

## 2020-06-13 NOTE — H&P ADULT - PROBLEM SELECTOR PLAN 1
pneumonia. s/p antibiotics    CRX Bilateral symmetric infiltrates. COVID 19 neg. Echo showed EF 30-35%, mod MR, mod AS.     c/w dilaudid PRN and NC oxygen

## 2020-06-13 NOTE — H&P ADULT - HISTORY OF PRESENT ILLNESS
89 y/o female from home with PMHx of HTN presents to the ED after a fall at home. History from patient is limited as patient is in moderate respiratory distress requiring nonrebreather mask for support. History aided by patient's daughter Tammi. As per daughter, patient has had three mechanical falls since Monday, with most recent fall being this morning. She denies noting any other issues or symptoms out of the ordinary. Per EMS, patient's O2 sat was in the 70s when she was found, and she was placed on NRB mask. At present, patient is in moderate respiratory distress with tachypnea. She is maintaining an O2 saturation of 97% on 100% NRB.  She was admitted for evaluation of repeated falls. She was found to have encephalopathy secondary to acute heart failure. TTE showed EF 30-35% with grade 2 diastolic dysfunction. She was started on diuretics with initial improvement of respiratory status. But her mental status worsened due to dehydration she was started on IV fluids. CT head did not show any acute change. She was found to have TAWANNA with metabolic acidosis. She was given IVF but developed volume overload. Antibiotic coverage was increased. But her mental status worsened with worsening symptoms of respiratory distress and secretions. Decision was made by family that hospice is appropriate for the patient. She is being discharged to Hospice care.

## 2020-06-14 NOTE — DISCHARGE NOTE FOR THE EXPIRED PATIENT - OTHER SIGNIFICANT FINDINGS
Organ Donation notified of patient's expiration. Pt is not a suitable candidate for donation due to her age. Called to see patient  for unresponsiveness. Patient pronounced at 07: 25AM.  Attending dr. Henderson informed. NOK daughter Tammi Boateng is notified. Autopsy denied.  Organ Donation notified of patient's expiration. Pt is not a suitable candidate for donation due to her age. Called to see patient  for unresponsiveness. Patient pronounced at 07: 25AM.  Attending dr. Henderson informed. NOK daughter Tammi Boateng not available left 3 VM over the phone.  incomplete note.                 Organ Donation notified of patient's expiration. Pt is not a suitable candidate for donation due to her age. Called to see patient  for unresponsiveness. Patient pronounced at 07: 25AM.  Attending dr. Henderson informed. NOK daughter Tammi Boateng notified. Autopsy denied.  Organ Donation notified of patient's expiration. Pt is not a suitable candidate for donation due to her age.

## 2020-06-14 NOTE — DISCHARGE NOTE FOR THE EXPIRED PATIENT - HOSPITAL COURSE
89 y/o female from home with PMHx of HTN presents to the ED after a fall at home. History from patient is limited as patient is in moderate respiratory distress requiring nonrebreather mask for support. History aided by patient's daughter Tammi. As per daughter, patient has had three mechanical falls since Monday, with most recent fall being this morning. She denies noting any other issues or symptoms out of the ordinary. Per EMS, patient's O2 sat was in the 70s when she was found, and she was placed on NRB mask. At present, patient is in moderate respiratory distress with tachypnea. She is maintaining an O2 saturation of 97% on 100% NRB.  She was admitted for evaluation of repeated falls. She was found to have encephalopathy secondary to acute heart failure. TTE showed EF 30-35% with grade 2 diastolic dysfunction. She was started on diuretics with initial improvement of respiratory status. But her mental status worsened due to dehydration she was started on IV fluids. CT head did not show any acute change. She was found to have TAWANNA with metabolic acidosis. She was given IVF but developed volume overload. Antibiotic coverage was increased. But her mental status worsened with worsening symptoms of respiratory distress and secretions. Decision was made by family that hospice is appropriate for the patient. She is being discharged to Hospice care. 89 y/o female from home with PMHx of HTN.  was admitted for evaluation of repeated falls. She was found to have encephalopathy secondary to acute heart failure. TTE showed EF 30-35% with grade 2 diastolic dysfunction. She was started on diuretics with initial improvement of respiratory status. But her mental status worsened due to dehydration she was started on IV fluids. CT head did not show any acute change. She was found to have TAWANNA with metabolic acidosis. She was given IVF but developed volume overload. Antibiotic coverage was increased. But her mental status worsened with worsening symptoms of respiratory distress and secretions. Decision was made by family that hospice is appropriate for the patient. She is being discharged to Hospice care. 89 y/o female from home with PMHx of HTN.  was admitted for evaluation of repeated falls. She was found to have encephalopathy secondary to acute heart failure and pneumonia. TTE showed EF 30-35% with grade 2 diastolic dysfunction. She was started on diuretics with initial improvement of respiratory status. But her mental status worsened due to dehydration she was started on IV fluids. CT head did not show any acute change. She was found to have TAWANNA with metabolic acidosis. She was given IVF but developed volume overload. Antibiotic coverage was increased. But her mental status worsened with worsening symptoms of respiratory distress and secretions. Decision was made by family that hospice is appropriate for the patient. She is being discharged to Hospice care.

## 2021-05-13 NOTE — PROGRESS NOTE ADULT - PROBLEM SELECTOR PLAN 3
This is a recent snapshot of the patient's [facility] medical record.  For current drug dose and complete information and questions, call [phone] or In Basket pool, [facility] ([id number])  CSN Number:  [id number]     EKG on admission shows afib with RVR  -FAMILY OPTED FOR HOSPICE